# Patient Record
Sex: FEMALE | Race: BLACK OR AFRICAN AMERICAN | Employment: UNEMPLOYED | URBAN - METROPOLITAN AREA
[De-identification: names, ages, dates, MRNs, and addresses within clinical notes are randomized per-mention and may not be internally consistent; named-entity substitution may affect disease eponyms.]

---

## 2017-10-31 ENCOUNTER — TELEPHONE (OUTPATIENT)
Dept: OBGYN | Age: 77
End: 2017-10-31

## 2017-10-31 DIAGNOSIS — Z12.31 SCREENING MAMMOGRAM, ENCOUNTER FOR: Primary | ICD-10-CM

## 2017-12-14 DIAGNOSIS — Z12.31 SCREENING MAMMOGRAM, ENCOUNTER FOR: ICD-10-CM

## 2018-12-11 ENCOUNTER — TELEPHONE (OUTPATIENT)
Dept: OBGYN CLINIC | Age: 78
End: 2018-12-11

## 2023-03-24 LAB
ALANINE AMINOTRANSFERASE (SGPT) (U/L) IN SER/PLAS: 14 U/L (ref 7–45)
ALBUMIN (G/DL) IN SER/PLAS: 3.9 G/DL (ref 3.4–5)
ALKALINE PHOSPHATASE (U/L) IN SER/PLAS: 41 U/L (ref 33–136)
ANION GAP IN SER/PLAS: 12 MMOL/L (ref 10–20)
ASPARTATE AMINOTRANSFERASE (SGOT) (U/L) IN SER/PLAS: 23 U/L (ref 9–39)
BASOPHILS (10*3/UL) IN BLOOD BY AUTOMATED COUNT: 0.02 X10E9/L (ref 0–0.1)
BASOPHILS/100 LEUKOCYTES IN BLOOD BY AUTOMATED COUNT: 0.3 % (ref 0–2)
BILIRUBIN TOTAL (MG/DL) IN SER/PLAS: 0.8 MG/DL (ref 0–1.2)
CALCIUM (MG/DL) IN SER/PLAS: 10 MG/DL (ref 8.6–10.3)
CARBON DIOXIDE, TOTAL (MMOL/L) IN SER/PLAS: 30 MMOL/L (ref 21–32)
CHLORIDE (MMOL/L) IN SER/PLAS: 103 MMOL/L (ref 98–107)
CHOLESTEROL (MG/DL) IN SER/PLAS: 183 MG/DL (ref 0–199)
CHOLESTEROL IN HDL (MG/DL) IN SER/PLAS: 74.2 MG/DL
CHOLESTEROL/HDL RATIO: 2.5
CREATININE (MG/DL) IN SER/PLAS: 0.58 MG/DL (ref 0.5–1.05)
EOSINOPHILS (10*3/UL) IN BLOOD BY AUTOMATED COUNT: 0.03 X10E9/L (ref 0–0.4)
EOSINOPHILS/100 LEUKOCYTES IN BLOOD BY AUTOMATED COUNT: 0.4 % (ref 0–6)
ERYTHROCYTE DISTRIBUTION WIDTH (RATIO) BY AUTOMATED COUNT: 14.6 % (ref 11.5–14.5)
ERYTHROCYTE MEAN CORPUSCULAR HEMOGLOBIN CONCENTRATION (G/DL) BY AUTOMATED: 30 G/DL (ref 32–36)
ERYTHROCYTE MEAN CORPUSCULAR VOLUME (FL) BY AUTOMATED COUNT: 93 FL (ref 80–100)
ERYTHROCYTES (10*6/UL) IN BLOOD BY AUTOMATED COUNT: 4.57 X10E12/L (ref 4–5.2)
GFR FEMALE: >90 ML/MIN/1.73M2
GLUCOSE (MG/DL) IN SER/PLAS: 80 MG/DL (ref 74–99)
HEMATOCRIT (%) IN BLOOD BY AUTOMATED COUNT: 42.7 % (ref 36–46)
HEMOGLOBIN (G/DL) IN BLOOD: 12.8 G/DL (ref 12–16)
IMMATURE GRANULOCYTES/100 LEUKOCYTES IN BLOOD BY AUTOMATED COUNT: 0.3 % (ref 0–0.9)
LDL: 94 MG/DL (ref 0–99)
LEUKOCYTES (10*3/UL) IN BLOOD BY AUTOMATED COUNT: 6.7 X10E9/L (ref 4.4–11.3)
LYMPHOCYTES (10*3/UL) IN BLOOD BY AUTOMATED COUNT: 1.55 X10E9/L (ref 0.8–3)
LYMPHOCYTES/100 LEUKOCYTES IN BLOOD BY AUTOMATED COUNT: 23.1 % (ref 13–44)
MONOCYTES (10*3/UL) IN BLOOD BY AUTOMATED COUNT: 0.42 X10E9/L (ref 0.05–0.8)
MONOCYTES/100 LEUKOCYTES IN BLOOD BY AUTOMATED COUNT: 6.3 % (ref 2–10)
NEUTROPHILS (10*3/UL) IN BLOOD BY AUTOMATED COUNT: 4.66 X10E9/L (ref 1.6–5.5)
NEUTROPHILS/100 LEUKOCYTES IN BLOOD BY AUTOMATED COUNT: 69.6 % (ref 40–80)
PLATELETS (10*3/UL) IN BLOOD AUTOMATED COUNT: 228 X10E9/L (ref 150–450)
POTASSIUM (MMOL/L) IN SER/PLAS: 3.5 MMOL/L (ref 3.5–5.3)
PROTEIN TOTAL: 7.4 G/DL (ref 6.4–8.2)
SODIUM (MMOL/L) IN SER/PLAS: 141 MMOL/L (ref 136–145)
TRIGLYCERIDE (MG/DL) IN SER/PLAS: 75 MG/DL (ref 0–149)
URATE (MG/DL) IN SER/PLAS: 4 MG/DL (ref 2.3–6.7)
UREA NITROGEN (MG/DL) IN SER/PLAS: 18 MG/DL (ref 6–23)
VLDL: 15 MG/DL (ref 0–40)

## 2023-04-20 LAB
ACTIVATED PARTIAL THROMBOPLASTIN TIME IN PPP BY COAGULATION ASSAY: 30 SEC (ref 26–39)
ALANINE AMINOTRANSFERASE (SGPT) (U/L) IN SER/PLAS: 18 U/L (ref 7–45)
ALBUMIN (G/DL) IN SER/PLAS: 3.8 G/DL (ref 3.4–5)
ALKALINE PHOSPHATASE (U/L) IN SER/PLAS: 45 U/L (ref 33–136)
ANION GAP IN SER/PLAS: 10 MMOL/L (ref 10–20)
ASPARTATE AMINOTRANSFERASE (SGOT) (U/L) IN SER/PLAS: 21 U/L (ref 9–39)
BASOPHILS (10*3/UL) IN BLOOD BY AUTOMATED COUNT: 0.02 X10E9/L (ref 0–0.1)
BASOPHILS/100 LEUKOCYTES IN BLOOD BY AUTOMATED COUNT: 0.3 % (ref 0–2)
BILIRUBIN TOTAL (MG/DL) IN SER/PLAS: 0.5 MG/DL (ref 0–1.2)
CALCIUM (MG/DL) IN SER/PLAS: 10.2 MG/DL (ref 8.6–10.3)
CARBON DIOXIDE, TOTAL (MMOL/L) IN SER/PLAS: 34 MMOL/L (ref 21–32)
CHLORIDE (MMOL/L) IN SER/PLAS: 100 MMOL/L (ref 98–107)
CREATININE (MG/DL) IN SER/PLAS: 0.6 MG/DL (ref 0.5–1.05)
EOSINOPHILS (10*3/UL) IN BLOOD BY AUTOMATED COUNT: 0.17 X10E9/L (ref 0–0.4)
EOSINOPHILS/100 LEUKOCYTES IN BLOOD BY AUTOMATED COUNT: 2.9 % (ref 0–6)
ERYTHROCYTE DISTRIBUTION WIDTH (RATIO) BY AUTOMATED COUNT: 14.5 % (ref 11.5–14.5)
ERYTHROCYTE MEAN CORPUSCULAR HEMOGLOBIN CONCENTRATION (G/DL) BY AUTOMATED: 29.9 G/DL (ref 32–36)
ERYTHROCYTE MEAN CORPUSCULAR VOLUME (FL) BY AUTOMATED COUNT: 92 FL (ref 80–100)
ERYTHROCYTES (10*6/UL) IN BLOOD BY AUTOMATED COUNT: 4.64 X10E12/L (ref 4–5.2)
GFR FEMALE: 90 ML/MIN/1.73M2
GLUCOSE (MG/DL) IN SER/PLAS: 92 MG/DL (ref 74–99)
HEMATOCRIT (%) IN BLOOD BY AUTOMATED COUNT: 42.8 % (ref 36–46)
HEMOGLOBIN (G/DL) IN BLOOD: 12.8 G/DL (ref 12–16)
IMMATURE GRANULOCYTES/100 LEUKOCYTES IN BLOOD BY AUTOMATED COUNT: 0.2 % (ref 0–0.9)
INR IN PPP BY COAGULATION ASSAY: 1 (ref 0.9–1.1)
LEUKOCYTES (10*3/UL) IN BLOOD BY AUTOMATED COUNT: 5.9 X10E9/L (ref 4.4–11.3)
LYMPHOCYTES (10*3/UL) IN BLOOD BY AUTOMATED COUNT: 1.32 X10E9/L (ref 0.8–3)
LYMPHOCYTES/100 LEUKOCYTES IN BLOOD BY AUTOMATED COUNT: 22.6 % (ref 13–44)
MONOCYTES (10*3/UL) IN BLOOD BY AUTOMATED COUNT: 0.42 X10E9/L (ref 0.05–0.8)
MONOCYTES/100 LEUKOCYTES IN BLOOD BY AUTOMATED COUNT: 7.2 % (ref 2–10)
NEUTROPHILS (10*3/UL) IN BLOOD BY AUTOMATED COUNT: 3.91 X10E9/L (ref 1.6–5.5)
NEUTROPHILS/100 LEUKOCYTES IN BLOOD BY AUTOMATED COUNT: 66.8 % (ref 40–80)
PLATELETS (10*3/UL) IN BLOOD AUTOMATED COUNT: 243 X10E9/L (ref 150–450)
POTASSIUM (MMOL/L) IN SER/PLAS: 3.7 MMOL/L (ref 3.5–5.3)
PROTEIN TOTAL: 7.5 G/DL (ref 6.4–8.2)
PROTHROMBIN TIME (PT) IN PPP BY COAGULATION ASSAY: 12 SEC (ref 9.8–13.4)
SODIUM (MMOL/L) IN SER/PLAS: 140 MMOL/L (ref 136–145)
UREA NITROGEN (MG/DL) IN SER/PLAS: 16 MG/DL (ref 6–23)

## 2023-04-25 ENCOUNTER — HOSPITAL ENCOUNTER (OUTPATIENT)
Dept: DATA CONVERSION | Facility: HOSPITAL | Age: 83
End: 2023-04-25
Attending: PLASTIC SURGERY | Admitting: PLASTIC SURGERY
Payer: MEDICARE

## 2023-04-25 DIAGNOSIS — Z79.82 LONG TERM (CURRENT) USE OF ASPIRIN: ICD-10-CM

## 2023-04-25 DIAGNOSIS — I10 ESSENTIAL (PRIMARY) HYPERTENSION: ICD-10-CM

## 2023-04-25 DIAGNOSIS — I25.10 ATHEROSCLEROTIC HEART DISEASE OF NATIVE CORONARY ARTERY WITHOUT ANGINA PECTORIS: ICD-10-CM

## 2023-04-25 DIAGNOSIS — M06.9 RHEUMATOID ARTHRITIS, UNSPECIFIED (MULTI): ICD-10-CM

## 2023-04-25 DIAGNOSIS — E78.5 HYPERLIPIDEMIA, UNSPECIFIED: ICD-10-CM

## 2023-04-25 DIAGNOSIS — L81.9 DISORDER OF PIGMENTATION, UNSPECIFIED: ICD-10-CM

## 2023-05-15 LAB
COMPLETE PATHOLOGY REPORT: NORMAL
CONVERTED CLINICAL DIAGNOSIS-HISTORY: NORMAL
CONVERTED FINAL DIAGNOSIS: NORMAL
CONVERTED FINAL REPORT PDF LINK TO COPY AND PASTE: NORMAL
CONVERTED GROSS DESCRIPTION: NORMAL

## 2023-07-20 ENCOUNTER — HOSPITAL ENCOUNTER (OUTPATIENT)
Dept: DATA CONVERSION | Facility: HOSPITAL | Age: 83
End: 2023-07-20
Attending: OTOLARYNGOLOGY | Admitting: OTOLARYNGOLOGY
Payer: MEDICARE

## 2023-07-20 DIAGNOSIS — R04.0 EPISTAXIS: ICD-10-CM

## 2023-07-25 LAB
ALANINE AMINOTRANSFERASE (SGPT) (U/L) IN SER/PLAS: 10 U/L (ref 7–45)
ALBUMIN (G/DL) IN SER/PLAS: 3.6 G/DL (ref 3.4–5)
ALKALINE PHOSPHATASE (U/L) IN SER/PLAS: 45 U/L (ref 33–136)
ANION GAP IN SER/PLAS: 10 MMOL/L (ref 10–20)
APPEARANCE, URINE: CLEAR
ASPARTATE AMINOTRANSFERASE (SGOT) (U/L) IN SER/PLAS: 16 U/L (ref 9–39)
BASOPHILS (10*3/UL) IN BLOOD BY AUTOMATED COUNT: 0.05 X10E9/L (ref 0–0.1)
BASOPHILS/100 LEUKOCYTES IN BLOOD BY AUTOMATED COUNT: 0.6 % (ref 0–2)
BILIRUBIN TOTAL (MG/DL) IN SER/PLAS: 0.5 MG/DL (ref 0–1.2)
BILIRUBIN, URINE: NEGATIVE
BLOOD, URINE: NEGATIVE
CALCIUM (MG/DL) IN SER/PLAS: 9.8 MG/DL (ref 8.6–10.3)
CARBON DIOXIDE, TOTAL (MMOL/L) IN SER/PLAS: 34 MMOL/L (ref 21–32)
CHLORIDE (MMOL/L) IN SER/PLAS: 103 MMOL/L (ref 98–107)
CHOLESTEROL (MG/DL) IN SER/PLAS: 184 MG/DL (ref 0–199)
CHOLESTEROL IN HDL (MG/DL) IN SER/PLAS: 68.8 MG/DL
CHOLESTEROL/HDL RATIO: 2.7
COLOR, URINE: YELLOW
CREATININE (MG/DL) IN SER/PLAS: 0.66 MG/DL (ref 0.5–1.05)
EOSINOPHILS (10*3/UL) IN BLOOD BY AUTOMATED COUNT: 0.22 X10E9/L (ref 0–0.4)
EOSINOPHILS/100 LEUKOCYTES IN BLOOD BY AUTOMATED COUNT: 2.6 % (ref 0–6)
ERYTHROCYTE DISTRIBUTION WIDTH (RATIO) BY AUTOMATED COUNT: 13.9 % (ref 11.5–14.5)
ERYTHROCYTE MEAN CORPUSCULAR HEMOGLOBIN CONCENTRATION (G/DL) BY AUTOMATED: 30.1 G/DL (ref 32–36)
ERYTHROCYTE MEAN CORPUSCULAR VOLUME (FL) BY AUTOMATED COUNT: 91 FL (ref 80–100)
ERYTHROCYTES (10*6/UL) IN BLOOD BY AUTOMATED COUNT: 4.33 X10E12/L (ref 4–5.2)
GFR FEMALE: 88 ML/MIN/1.73M2
GLUCOSE (MG/DL) IN SER/PLAS: 73 MG/DL (ref 74–99)
GLUCOSE, URINE: NEGATIVE MG/DL
HEMATOCRIT (%) IN BLOOD BY AUTOMATED COUNT: 39.5 % (ref 36–46)
HEMOGLOBIN (G/DL) IN BLOOD: 11.9 G/DL (ref 12–16)
IMMATURE GRANULOCYTES/100 LEUKOCYTES IN BLOOD BY AUTOMATED COUNT: 0.2 % (ref 0–0.9)
KETONES, URINE: NEGATIVE MG/DL
LDL: 95 MG/DL (ref 0–99)
LEUKOCYTE ESTERASE, URINE: NEGATIVE
LEUKOCYTES (10*3/UL) IN BLOOD BY AUTOMATED COUNT: 8.5 X10E9/L (ref 4.4–11.3)
LYMPHOCYTES (10*3/UL) IN BLOOD BY AUTOMATED COUNT: 3.19 X10E9/L (ref 0.8–3)
LYMPHOCYTES/100 LEUKOCYTES IN BLOOD BY AUTOMATED COUNT: 37.5 % (ref 13–44)
MONOCYTES (10*3/UL) IN BLOOD BY AUTOMATED COUNT: 0.74 X10E9/L (ref 0.05–0.8)
MONOCYTES/100 LEUKOCYTES IN BLOOD BY AUTOMATED COUNT: 8.7 % (ref 2–10)
NEUTROPHILS (10*3/UL) IN BLOOD BY AUTOMATED COUNT: 4.28 X10E9/L (ref 1.6–5.5)
NEUTROPHILS/100 LEUKOCYTES IN BLOOD BY AUTOMATED COUNT: 50.4 % (ref 40–80)
NITRITE, URINE: NEGATIVE
PH, URINE: 6 (ref 5–8)
PLATELETS (10*3/UL) IN BLOOD AUTOMATED COUNT: 261 X10E9/L (ref 150–450)
POTASSIUM (MMOL/L) IN SER/PLAS: 4 MMOL/L (ref 3.5–5.3)
PROTEIN TOTAL: 7.1 G/DL (ref 6.4–8.2)
PROTEIN, URINE: NEGATIVE MG/DL
SODIUM (MMOL/L) IN SER/PLAS: 143 MMOL/L (ref 136–145)
SPECIFIC GRAVITY, URINE: 1.02 (ref 1–1.03)
TRIGLYCERIDE (MG/DL) IN SER/PLAS: 99 MG/DL (ref 0–149)
URATE (MG/DL) IN SER/PLAS: 5.1 MG/DL (ref 2.3–6.7)
UREA NITROGEN (MG/DL) IN SER/PLAS: 14 MG/DL (ref 6–23)
UROBILINOGEN, URINE: <2 MG/DL (ref 0–1.9)
VLDL: 20 MG/DL (ref 0–40)

## 2023-07-26 LAB — URINE CULTURE: ABNORMAL

## 2023-08-23 LAB
ALANINE AMINOTRANSFERASE (SGPT) (U/L) IN SER/PLAS: 9 U/L (ref 7–45)
ALBUMIN (G/DL) IN SER/PLAS: 3.7 G/DL (ref 3.4–5)
ALKALINE PHOSPHATASE (U/L) IN SER/PLAS: 45 U/L (ref 33–136)
ANION GAP IN SER/PLAS: 14 MMOL/L (ref 10–20)
ASPARTATE AMINOTRANSFERASE (SGOT) (U/L) IN SER/PLAS: 18 U/L (ref 9–39)
BILIRUBIN TOTAL (MG/DL) IN SER/PLAS: 0.3 MG/DL (ref 0–1.2)
CALCIUM (MG/DL) IN SER/PLAS: 10.1 MG/DL (ref 8.6–10.3)
CARBON DIOXIDE, TOTAL (MMOL/L) IN SER/PLAS: 27 MMOL/L (ref 21–32)
CHLORIDE (MMOL/L) IN SER/PLAS: 104 MMOL/L (ref 98–107)
CREATININE (MG/DL) IN SER/PLAS: 0.76 MG/DL (ref 0.5–1.05)
ERYTHROCYTE DISTRIBUTION WIDTH (RATIO) BY AUTOMATED COUNT: 13.9 % (ref 11.5–14.5)
ERYTHROCYTE MEAN CORPUSCULAR HEMOGLOBIN CONCENTRATION (G/DL) BY AUTOMATED: 30.7 G/DL (ref 32–36)
ERYTHROCYTE MEAN CORPUSCULAR VOLUME (FL) BY AUTOMATED COUNT: 92 FL (ref 80–100)
ERYTHROCYTES (10*6/UL) IN BLOOD BY AUTOMATED COUNT: 4.36 X10E12/L (ref 4–5.2)
GFR FEMALE: 78 ML/MIN/1.73M2
GLUCOSE (MG/DL) IN SER/PLAS: 78 MG/DL (ref 74–99)
HEMATOCRIT (%) IN BLOOD BY AUTOMATED COUNT: 40.1 % (ref 36–46)
HEMOGLOBIN (G/DL) IN BLOOD: 12.3 G/DL (ref 12–16)
LEUKOCYTES (10*3/UL) IN BLOOD BY AUTOMATED COUNT: 6.5 X10E9/L (ref 4.4–11.3)
PLATELETS (10*3/UL) IN BLOOD AUTOMATED COUNT: 222 X10E9/L (ref 150–450)
POTASSIUM (MMOL/L) IN SER/PLAS: 3.3 MMOL/L (ref 3.5–5.3)
PROTEIN TOTAL: 7.2 G/DL (ref 6.4–8.2)
SEDIMENTATION RATE, ERYTHROCYTE: 19 MM/H (ref 0–30)
SODIUM (MMOL/L) IN SER/PLAS: 142 MMOL/L (ref 136–145)
UREA NITROGEN (MG/DL) IN SER/PLAS: 20 MG/DL (ref 6–23)

## 2023-09-14 NOTE — H&P
History & Physical Reviewed:   I have reviewed the History and Physical dated:  25-Apr-2023   History and Physical reviewed and relevant findings noted. Patient examined to review pertinent physical  findings.: No significant changes   Home Medications Reviewed: no changes noted   Allergies Reviewed: no changes noted       ERAS (Enhanced Recovery After Surgery):  ·  ERAS Patient: no     Consent:   COVID-19 Consent:  ·  COVID-19 Risk Consent Surgeon has reviewed key risks related to the risk of alin COVID-19 and if they contract COVID-19 what the risks are.       Electronic Signatures:  Reyes, Roland (MD)  (Signed 25-Apr-2023 09:20)   Authored: History & Physical Reviewed, ERAS, Consent,  Note Completion      Last Updated: 25-Apr-2023 09:20 by Reyes, Roland (MD)

## 2023-09-29 VITALS — HEIGHT: 62 IN | WEIGHT: 162.7 LBS | BODY MASS INDEX: 29.94 KG/M2

## 2023-09-30 NOTE — H&P
History & Physical Reviewed:   I have reviewed the History and Physical dated:  07-Jul-2023   History and Physical reviewed and relevant findings noted. Patient examined to review pertinent physical  findings.: No significant changes   Home Medications Reviewed: no changes noted   Allergies Reviewed: no changes noted       ERAS (Enhanced Recovery After Surgery):  ·  ERAS Patient: no     Consent:   COVID-19 Consent:  ·  COVID-19 Risk Consent Surgeon has reviewed key risks related to the risk of alin COVID-19 and if they contract COVID-19 what the risks are.       Electronic Signatures:  Keny Mcgovern)  (Signed 20-Jul-2023 07:11)   Authored: History & Physical Reviewed, ERAS, Consent,  Note Completion      Last Updated: 20-Jul-2023 07:11 by Keny Mcgovern)

## 2023-10-02 ENCOUNTER — PHARMACY VISIT (OUTPATIENT)
Dept: PHARMACY | Facility: CLINIC | Age: 83
End: 2023-10-02
Payer: MEDICARE

## 2023-10-02 NOTE — OP NOTE
Post Operative Note:     PreOp Diagnosis: Pigmented lesion left foot   Post-Procedure Diagnosis: Pigmented lesion left foot   Procedure: 1.  Excision pigmented lesion left foot  2.   3.   4.   5.   Surgeon: Roland Reyes MD   Resident/Fellow/Other Assistant: Kurt Cordero   Estimated Blood Loss (mL): Minimal   Specimen: yes. Pigmented lesion foot   Findings: Minimal lesion left foot       Electronic Signatures:  Reyes, Roland (MD)  (Signed 25-Apr-2023 10:29)   Authored: Post Operative Note, Note Completion      Last Updated: 25-Apr-2023 10:29 by Reyes, Roland (MD)

## 2023-10-02 NOTE — OP NOTE
PROCEDURE DETAILS    Preoperative Diagnosis:  Epistaxis right  Postoperative Diagnosis:  Epistaxis right  Surgeon: Keny Mcgovern  Resident/Fellow/Other Assistant: None of these were associated with this case    Procedure:  1. CONTROL EPISTAXIS     Anesthesia: No anesthesiologist associated with this case  Estimated Blood Loss: min  Findings: prominent hemangioma ablated  Specimens(s) Collected: no,     Complications: none        Operative Report:   Patient was taken to the operative room and administered general anesthesia.  Appropriate prep drape and timeout performed in usual manner.  Had nasal cavity sprayed with Afrin.  Under  direct headlight visualization with nasal speculum prominent hemangioma right alar rim superiorly was identified as a bleeding source and was ablated with bipolar cautery at a low setting.  Upon completion of same look dramatically better and antibiotic  ointment is applied in usual manner.  Patient allowed to be released and taken recovery in stable condition.  Estimate blood is minimal and there were no complications.                        Attestation:   Note Completion:  Attending Attestation I performed the procedure without a resident         Electronic Signatures:  Keny Mcgovern)  (Signed 20-Jul-2023 09:34)   Authored: Post-Operative Note, Chart Review, Note Completion      Last Updated: 20-Jul-2023 09:34 by Keny Mcgovern)

## 2023-10-03 PROCEDURE — RXMED WILLOW AMBULATORY MEDICATION CHARGE

## 2023-10-30 DIAGNOSIS — M06.9 RHEUMATOID ARTHRITIS, INVOLVING UNSPECIFIED SITE, UNSPECIFIED WHETHER RHEUMATOID FACTOR PRESENT (MULTI): ICD-10-CM

## 2023-10-30 DIAGNOSIS — M05.79 SEROPOSITIVE RHEUMATOID ARTHRITIS OF MULTIPLE SITES (MULTI): ICD-10-CM

## 2023-10-30 RX ORDER — FAMOTIDINE 10 MG/ML
20 INJECTION INTRAVENOUS ONCE AS NEEDED
Status: CANCELLED | OUTPATIENT
Start: 2023-11-01

## 2023-10-30 RX ORDER — DIPHENHYDRAMINE HYDROCHLORIDE 50 MG/ML
50 INJECTION INTRAMUSCULAR; INTRAVENOUS AS NEEDED
Status: CANCELLED | OUTPATIENT
Start: 2023-11-01

## 2023-10-30 RX ORDER — ALBUTEROL SULFATE 0.83 MG/ML
3 SOLUTION RESPIRATORY (INHALATION) AS NEEDED
Status: CANCELLED | OUTPATIENT
Start: 2023-11-01

## 2023-10-30 RX ORDER — EPINEPHRINE 0.3 MG/.3ML
0.3 INJECTION SUBCUTANEOUS EVERY 5 MIN PRN
Status: CANCELLED | OUTPATIENT
Start: 2023-11-01

## 2023-11-07 ENCOUNTER — INFUSION (OUTPATIENT)
Dept: HEMATOLOGY/ONCOLOGY | Facility: CLINIC | Age: 83
End: 2023-11-07
Payer: MEDICARE

## 2023-11-07 VITALS
RESPIRATION RATE: 20 BRPM | SYSTOLIC BLOOD PRESSURE: 113 MMHG | WEIGHT: 167.77 LBS | DIASTOLIC BLOOD PRESSURE: 70 MMHG | BODY MASS INDEX: 30.72 KG/M2 | TEMPERATURE: 97.3 F | HEART RATE: 52 BPM | OXYGEN SATURATION: 95 %

## 2023-11-07 DIAGNOSIS — M06.9 RHEUMATOID ARTHRITIS (MULTI): ICD-10-CM

## 2023-11-07 DIAGNOSIS — M06.9 RHEUMATOID ARTHRITIS, INVOLVING UNSPECIFIED SITE, UNSPECIFIED WHETHER RHEUMATOID FACTOR PRESENT (MULTI): ICD-10-CM

## 2023-11-07 PROBLEM — H90.72 MIXED HEARING LOSS OF LEFT EAR: Status: ACTIVE | Noted: 2023-11-07

## 2023-11-07 PROBLEM — E21.3 HYPERPARATHYROIDISM (MULTI): Status: ACTIVE | Noted: 2023-11-07

## 2023-11-07 PROBLEM — D23.9 DERMATOFIBROMA: Status: ACTIVE | Noted: 2023-11-07

## 2023-11-07 PROBLEM — I10 ESSENTIAL HYPERTENSION: Status: ACTIVE | Noted: 2023-11-07

## 2023-11-07 PROBLEM — J31.0 CHRONIC RHINITIS: Status: ACTIVE | Noted: 2023-11-07

## 2023-11-07 PROBLEM — L81.9 ATYPICAL PIGMENTED SKIN LESION: Status: ACTIVE | Noted: 2023-11-07

## 2023-11-07 PROBLEM — E66.811 CLASS 1 OBESITY WITH BODY MASS INDEX (BMI) OF 30.0 TO 30.9 IN ADULT: Status: ACTIVE | Noted: 2023-11-07

## 2023-11-07 PROBLEM — Z87.891 H/O NICOTINE DEPENDENCE: Status: ACTIVE | Noted: 2023-11-07

## 2023-11-07 PROBLEM — E78.2 MIXED HYPERLIPIDEMIA: Status: ACTIVE | Noted: 2023-11-07

## 2023-11-07 PROBLEM — H72.92 TYMPANIC MEMBRANE PERFORATION, LEFT: Status: ACTIVE | Noted: 2023-11-07

## 2023-11-07 PROBLEM — H90.5 SENSORINEURAL HEARING LOSS OF RIGHT EAR: Status: ACTIVE | Noted: 2023-11-07

## 2023-11-07 PROBLEM — I25.10 CORONARY ARTERY DISEASE WITHOUT ANGINA PECTORIS: Status: ACTIVE | Noted: 2023-11-07

## 2023-11-07 PROBLEM — H65.22 CHRONIC SEROUS OTITIS MEDIA OF LEFT EAR: Status: ACTIVE | Noted: 2023-11-07

## 2023-11-07 PROBLEM — H92.10 OTORRHEA: Status: ACTIVE | Noted: 2023-11-07

## 2023-11-07 PROBLEM — E66.9 CLASS 1 OBESITY WITH BODY MASS INDEX (BMI) OF 30.0 TO 30.9 IN ADULT: Status: ACTIVE | Noted: 2023-11-07

## 2023-11-07 PROBLEM — J34.89 NASAL VESTIBULITIS: Status: ACTIVE | Noted: 2023-11-07

## 2023-11-07 PROCEDURE — 96415 CHEMO IV INFUSION ADDL HR: CPT

## 2023-11-07 PROCEDURE — 2500000004 HC RX 250 GENERAL PHARMACY W/ HCPCS (ALT 636 FOR OP/ED): Performed by: INTERNAL MEDICINE

## 2023-11-07 PROCEDURE — 96413 CHEMO IV INFUSION 1 HR: CPT

## 2023-11-07 RX ORDER — AMLODIPINE BESYLATE 10 MG/1
1 TABLET ORAL DAILY
COMMUNITY

## 2023-11-07 RX ORDER — IBUPROFEN 800 MG/1
1 TABLET ORAL
COMMUNITY
End: 2024-03-01 | Stop reason: HOSPADM

## 2023-11-07 RX ORDER — ACETAMINOPHEN 500 MG
1 TABLET ORAL 2 TIMES WEEKLY
COMMUNITY

## 2023-11-07 RX ORDER — METOPROLOL SUCCINATE 100 MG/1
100 TABLET, EXTENDED RELEASE ORAL DAILY
COMMUNITY

## 2023-11-07 RX ORDER — POTASSIUM CHLORIDE 750 MG/1
10 TABLET, EXTENDED RELEASE ORAL DAILY
COMMUNITY

## 2023-11-07 RX ORDER — NAPROXEN SODIUM 220 MG/1
2 TABLET, FILM COATED ORAL DAILY
COMMUNITY

## 2023-11-07 RX ORDER — FOLIC ACID 1 MG/1
1 TABLET ORAL
COMMUNITY
Start: 2015-06-05

## 2023-11-07 RX ORDER — FAMOTIDINE 10 MG/ML
20 INJECTION INTRAVENOUS ONCE AS NEEDED
Status: CANCELLED | OUTPATIENT
Start: 2024-01-02

## 2023-11-07 RX ORDER — PREDNISONE 5 MG/1
5 TABLET ORAL DAILY
COMMUNITY

## 2023-11-07 RX ORDER — LOSARTAN POTASSIUM AND HYDROCHLOROTHIAZIDE 25; 100 MG/1; MG/1
1 TABLET ORAL DAILY
COMMUNITY
End: 2024-03-01 | Stop reason: HOSPADM

## 2023-11-07 RX ORDER — EPINEPHRINE 0.3 MG/.3ML
0.3 INJECTION SUBCUTANEOUS EVERY 5 MIN PRN
Status: CANCELLED | OUTPATIENT
Start: 2024-01-02

## 2023-11-07 RX ORDER — INFLIXIMAB 100 MG/10ML
INJECTION, POWDER, LYOPHILIZED, FOR SOLUTION INTRAVENOUS
COMMUNITY

## 2023-11-07 RX ORDER — DIPHENHYDRAMINE HYDROCHLORIDE 50 MG/ML
50 INJECTION INTRAMUSCULAR; INTRAVENOUS AS NEEDED
Status: CANCELLED | OUTPATIENT
Start: 2024-01-02

## 2023-11-07 RX ORDER — VALSARTAN AND HYDROCHLOROTHIAZIDE 80; 12.5 MG/1; MG/1
1 TABLET, FILM COATED ORAL DAILY
COMMUNITY
End: 2024-03-01 | Stop reason: HOSPADM

## 2023-11-07 RX ORDER — ALBUTEROL SULFATE 0.83 MG/ML
3 SOLUTION RESPIRATORY (INHALATION) AS NEEDED
Status: CANCELLED | OUTPATIENT
Start: 2024-01-02

## 2023-11-07 RX ORDER — METHOTREXATE 2.5 MG/1
12.5 TABLET ORAL
COMMUNITY
Start: 2015-02-16 | End: 2024-03-01 | Stop reason: HOSPADM

## 2023-11-07 RX ADMIN — SODIUM CHLORIDE 400 MG: 9 INJECTION, SOLUTION INTRAVENOUS at 09:00

## 2023-11-07 ASSESSMENT — PAIN SCALES - GENERAL: PAINLEVEL: 0-NO PAIN

## 2023-11-20 ENCOUNTER — LAB (OUTPATIENT)
Dept: LAB | Facility: LAB | Age: 83
End: 2023-11-20
Payer: MEDICARE

## 2023-11-20 DIAGNOSIS — E87.6 HYPOKALEMIA: ICD-10-CM

## 2023-11-20 DIAGNOSIS — M05.79 RHEUMATOID ARTHRITIS WITH RHEUMATOID FACTOR OF MULTIPLE SITES WITHOUT ORGAN OR SYSTEMS INVOLVEMENT (MULTI): Primary | ICD-10-CM

## 2023-11-20 LAB
ALBUMIN SERPL BCP-MCNC: 3.8 G/DL (ref 3.4–5)
ALP SERPL-CCNC: 48 U/L (ref 33–136)
ALT SERPL W P-5'-P-CCNC: 10 U/L (ref 7–45)
AST SERPL W P-5'-P-CCNC: 16 U/L (ref 9–39)
BILIRUB DIRECT SERPL-MCNC: 0.1 MG/DL (ref 0–0.3)
BILIRUB SERPL-MCNC: 0.5 MG/DL (ref 0–1.2)
ERYTHROCYTE [DISTWIDTH] IN BLOOD BY AUTOMATED COUNT: 14.9 % (ref 11.5–14.5)
HCT VFR BLD AUTO: 43.2 % (ref 36–46)
HGB BLD-MCNC: 13 G/DL (ref 12–16)
MCH RBC QN AUTO: 28 PG (ref 26–34)
MCHC RBC AUTO-ENTMCNC: 30.1 G/DL (ref 32–36)
MCV RBC AUTO: 93 FL (ref 80–100)
NRBC BLD-RTO: 0 /100 WBCS (ref 0–0)
PLATELET # BLD AUTO: 227 X10*3/UL (ref 150–450)
POTASSIUM SERPL-SCNC: 3.9 MMOL/L (ref 3.5–5.3)
PROT SERPL-MCNC: 7.3 G/DL (ref 6.4–8.2)
RBC # BLD AUTO: 4.65 X10*6/UL (ref 4–5.2)
WBC # BLD AUTO: 7.2 X10*3/UL (ref 4.4–11.3)

## 2023-11-20 PROCEDURE — 36415 COLL VENOUS BLD VENIPUNCTURE: CPT

## 2023-11-20 PROCEDURE — 84132 ASSAY OF SERUM POTASSIUM: CPT

## 2023-11-20 PROCEDURE — 80076 HEPATIC FUNCTION PANEL: CPT

## 2023-11-20 PROCEDURE — 85027 COMPLETE CBC AUTOMATED: CPT

## 2023-11-30 ENCOUNTER — LAB (OUTPATIENT)
Dept: LAB | Facility: LAB | Age: 83
End: 2023-11-30
Payer: MEDICARE

## 2023-11-30 DIAGNOSIS — I12.9 HYPERTENSIVE CHRONIC KIDNEY DISEASE WITH STAGE 1 THROUGH STAGE 4 CHRONIC KIDNEY DISEASE, OR UNSPECIFIED CHRONIC KIDNEY DISEASE: ICD-10-CM

## 2023-11-30 DIAGNOSIS — Z13.89 ENCOUNTER FOR SCREENING FOR OTHER DISORDER: ICD-10-CM

## 2023-11-30 DIAGNOSIS — E78.49 OTHER HYPERLIPIDEMIA: Primary | ICD-10-CM

## 2023-11-30 LAB
ALBUMIN SERPL BCP-MCNC: 3.9 G/DL (ref 3.4–5)
ALP SERPL-CCNC: 47 U/L (ref 33–136)
ALT SERPL W P-5'-P-CCNC: 10 U/L (ref 7–45)
ANION GAP SERPL CALC-SCNC: 11 MMOL/L (ref 10–20)
AST SERPL W P-5'-P-CCNC: 15 U/L (ref 9–39)
BASOPHILS # BLD AUTO: 0.02 X10*3/UL (ref 0–0.1)
BASOPHILS NFR BLD AUTO: 0.3 %
BILIRUB SERPL-MCNC: 0.5 MG/DL (ref 0–1.2)
BUN SERPL-MCNC: 14 MG/DL (ref 6–23)
CALCIUM SERPL-MCNC: 9.7 MG/DL (ref 8.6–10.3)
CHLORIDE SERPL-SCNC: 102 MMOL/L (ref 98–107)
CHOLEST SERPL-MCNC: 198 MG/DL (ref 0–199)
CHOLESTEROL/HDL RATIO: 2.8
CO2 SERPL-SCNC: 32 MMOL/L (ref 21–32)
CREAT SERPL-MCNC: 0.68 MG/DL (ref 0.5–1.05)
EOSINOPHIL # BLD AUTO: 0.21 X10*3/UL (ref 0–0.4)
EOSINOPHIL NFR BLD AUTO: 2.8 %
ERYTHROCYTE [DISTWIDTH] IN BLOOD BY AUTOMATED COUNT: 14.9 % (ref 11.5–14.5)
GFR SERPL CREATININE-BSD FRML MDRD: 88 ML/MIN/1.73M*2
GLUCOSE SERPL-MCNC: 72 MG/DL (ref 74–99)
HCT VFR BLD AUTO: 40.2 % (ref 36–46)
HDLC SERPL-MCNC: 69.8 MG/DL
HGB BLD-MCNC: 12.2 G/DL (ref 12–16)
IMM GRANULOCYTES # BLD AUTO: 0.01 X10*3/UL (ref 0–0.5)
IMM GRANULOCYTES NFR BLD AUTO: 0.1 % (ref 0–0.9)
LDLC SERPL CALC-MCNC: 109 MG/DL
LYMPHOCYTES # BLD AUTO: 2.53 X10*3/UL (ref 0.8–3)
LYMPHOCYTES NFR BLD AUTO: 34 %
MCH RBC QN AUTO: 27.6 PG (ref 26–34)
MCHC RBC AUTO-ENTMCNC: 30.3 G/DL (ref 32–36)
MCV RBC AUTO: 91 FL (ref 80–100)
MONOCYTES # BLD AUTO: 0.72 X10*3/UL (ref 0.05–0.8)
MONOCYTES NFR BLD AUTO: 9.7 %
NEUTROPHILS # BLD AUTO: 3.95 X10*3/UL (ref 1.6–5.5)
NEUTROPHILS NFR BLD AUTO: 53.1 %
NON HDL CHOLESTEROL: 128 MG/DL (ref 0–149)
NRBC BLD-RTO: 0 /100 WBCS (ref 0–0)
PLATELET # BLD AUTO: 240 X10*3/UL (ref 150–450)
POTASSIUM SERPL-SCNC: 3.9 MMOL/L (ref 3.5–5.3)
PROT SERPL-MCNC: 7.4 G/DL (ref 6.4–8.2)
RBC # BLD AUTO: 4.42 X10*6/UL (ref 4–5.2)
SODIUM SERPL-SCNC: 141 MMOL/L (ref 136–145)
TRIGL SERPL-MCNC: 94 MG/DL (ref 0–149)
VLDL: 19 MG/DL (ref 0–40)
WBC # BLD AUTO: 7.4 X10*3/UL (ref 4.4–11.3)

## 2023-11-30 PROCEDURE — 85025 COMPLETE CBC W/AUTO DIFF WBC: CPT

## 2023-11-30 PROCEDURE — 80053 COMPREHEN METABOLIC PANEL: CPT

## 2023-11-30 PROCEDURE — 80061 LIPID PANEL: CPT

## 2023-11-30 PROCEDURE — 36415 COLL VENOUS BLD VENIPUNCTURE: CPT

## 2023-12-26 ENCOUNTER — APPOINTMENT (OUTPATIENT)
Dept: HEMATOLOGY/ONCOLOGY | Facility: CLINIC | Age: 83
End: 2023-12-26
Payer: MEDICARE

## 2024-01-02 ENCOUNTER — APPOINTMENT (OUTPATIENT)
Dept: HEMATOLOGY/ONCOLOGY | Facility: CLINIC | Age: 84
End: 2024-01-02
Payer: MEDICARE

## 2024-01-03 ENCOUNTER — APPOINTMENT (OUTPATIENT)
Dept: RADIOLOGY | Facility: HOSPITAL | Age: 84
End: 2024-01-03
Payer: MEDICARE

## 2024-01-03 ENCOUNTER — HOSPITAL ENCOUNTER (EMERGENCY)
Facility: HOSPITAL | Age: 84
Discharge: HOME | End: 2024-01-03
Payer: MEDICARE

## 2024-01-03 VITALS
HEIGHT: 62 IN | DIASTOLIC BLOOD PRESSURE: 65 MMHG | HEART RATE: 64 BPM | WEIGHT: 164 LBS | OXYGEN SATURATION: 100 % | SYSTOLIC BLOOD PRESSURE: 142 MMHG | TEMPERATURE: 97.7 F | RESPIRATION RATE: 16 BRPM | BODY MASS INDEX: 30.18 KG/M2

## 2024-01-03 DIAGNOSIS — S22.32XA CLOSED FRACTURE OF ONE RIB OF LEFT SIDE, INITIAL ENCOUNTER: ICD-10-CM
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
## 2024-01-03 LAB — SARS-COV-2 RNA RESP QL NAA+PROBE: DETECTED

## 2024-01-03 PROCEDURE — 99284 EMERGENCY DEPT VISIT MOD MDM: CPT | Mod: 25

## 2024-01-03 PROCEDURE — 99284 EMERGENCY DEPT VISIT MOD MDM: CPT

## 2024-01-03 PROCEDURE — 71250 CT THORAX DX C-: CPT

## 2024-01-03 PROCEDURE — 71250 CT THORAX DX C-: CPT | Performed by: RADIOLOGY

## 2024-01-03 PROCEDURE — 87635 SARS-COV-2 COVID-19 AMP PRB: CPT

## 2024-01-03 RX ORDER — LIDOCAINE 560 MG/1
1 PATCH PERCUTANEOUS; TOPICAL; TRANSDERMAL DAILY
Qty: 5 PATCH | Refills: 0 | Status: SHIPPED | OUTPATIENT
Start: 2024-01-03 | End: 2024-01-08

## 2024-01-03 RX ORDER — HYDROCODONE BITARTRATE AND ACETAMINOPHEN 5; 325 MG/1; MG/1
1 TABLET ORAL EVERY 6 HOURS PRN
Qty: 20 TABLET | Refills: 0 | Status: SHIPPED | OUTPATIENT
Start: 2024-01-03 | End: 2024-01-06

## 2024-01-03 ASSESSMENT — COLUMBIA-SUICIDE SEVERITY RATING SCALE - C-SSRS
6. HAVE YOU EVER DONE ANYTHING, STARTED TO DO ANYTHING, OR PREPARED TO DO ANYTHING TO END YOUR LIFE?: NO
2. HAVE YOU ACTUALLY HAD ANY THOUGHTS OF KILLING YOURSELF?: NO
1. IN THE PAST MONTH, HAVE YOU WISHED YOU WERE DEAD OR WISHED YOU COULD GO TO SLEEP AND NOT WAKE UP?: NO

## 2024-01-03 ASSESSMENT — LIFESTYLE VARIABLES
REASON UNABLE TO ASSESS: NO
HAVE PEOPLE ANNOYED YOU BY CRITICIZING YOUR DRINKING: NO
EVER FELT BAD OR GUILTY ABOUT YOUR DRINKING: NO
HAVE YOU EVER FELT YOU SHOULD CUT DOWN ON YOUR DRINKING: NO
EVER HAD A DRINK FIRST THING IN THE MORNING TO STEADY YOUR NERVES TO GET RID OF A HANGOVER: NO

## 2024-01-03 ASSESSMENT — PAIN SCALES - GENERAL: PAINLEVEL_OUTOF10: 9

## 2024-01-03 ASSESSMENT — PAIN - FUNCTIONAL ASSESSMENT: PAIN_FUNCTIONAL_ASSESSMENT: 0-10

## 2024-01-03 NOTE — ED PROVIDER NOTES
HPI   Chief Complaint   Patient presents with    Fall     Pt fell yesterday she missed a step denies hitting head for thinners c/o left rib pain and shoulder from the fall       History provided by: Patient    Limitations to history: None    CC: Rib injury    HPI: 83-year-old female presents the emergency department to be evaluated with her family for a left rib injury.  Patient states that she was walking down the stairs when she accidentally missed the last stair and fell directly on her left side.  She denies hitting her head or losing consciousness, she takes a baby aspirin every day but denies use of anticoagulants or history intracranial hemorrhage.  Denies headache, vision changes, neck pain, nausea vomiting.  Patient denies history of frequent falls or syncopal episodes.  Denies feeling weak or fatigued.  Her only complaint is pain in her left ribs that is worse with movement.  She states is hard for her to take a deep breath because of the pain.  Was not having any of this pain before she fell and denies having chest pain, feeling short of breath, cough, or hemoptysis.  Denies blood in the urine or stool.  She has been taking ibuprofen and using heating pads with some relief of her discomfort but wanted to make sure nothing was broken.  Denies pain or injury elsewhere.  Denies numbness, tingling, weakness in the extremities.  Denies history of heart or lung disease.  Denies all other systemic symptoms.    ROS: Negative unless mentioned in HPI    Social Hx: Denies tobacco, alcohol, drug use    Medical Hx: Medical history significant for hypertension and hyperlipidemia.  Allergy to shellfish.  Immunizations are up-to-date.    Physical exam:    Constitutional: Patient is well-nourished and well-developed.  Sitting comfortably in the room and in no distress.  Oriented to person, place, time, and situation.    HEENT: Head is normocephalic, atraumatic. Patient's airway is patent.  Tympanic membranes are clear  bilaterally.  Nasal mucosa clear.  Mouth with normal mucosa.  Throat is not erythematous and there are no oropharyngeal exudates, uvula is midline.  No obvious facial deformities.    Eyes: Clear bilaterally.  Pupils are equal round and reactive to light and accommodation.  Extraocular movements intact.      Cardiac: Regular rate, regular rhythm.  Heart sounds S1, S2.  No murmurs, rubs, or gallops.  PMI nondisplaced.  No JVD.    Respiratory: 100% on room air.  Regular respiratory rate and effort.  Breath sounds are clear and equal bilaterally, no adventitious lung sounds.  Patient is speaking in full sentences and is in no apparent respiratory distress. No use of accessory muscles.      Gastrointestinal: Abdomen is soft, nondistended, and nontender.  There are no obvious deformities.  No rebound tenderness or guarding.  Bowel sounds are normal active.    Genitourinary: No CVA or flank tenderness.    Musculoskeletal: Reproducible tenderness over the left lateral and posterior ribs.  No obvious skin or bony deformities.  Patient has equal range of motion in all extremities and no strength deficiencies.  No back or neck tenderness.  Capillary refill less than 3 seconds.  Strong peripheral pulses.  No sensory deficits.    Neurological: Patient is alert and oriented.  No focal deficits.  5/5 strength in all extremities.  Cranial nerves II through XII intact. GCS15.     Skin: Skin is normal color for race and is warm, dry, and intact.  No evidence of trauma.  No lesions, rashes, bruising, jaundice, or masses.    Psych: Appropriate mood and affect.  No apparent risk to self or others.    Heme/lymph: No adenopathy, lymphadenopathy, or splenomegaly    Physical exam is otherwise negative unless stated above or in history of present illness.    Patient updated on plan for lab testing, IV insertion, radiology imaging, and medications to be administered while in the ER (if indicated). Patient updated on expected wait times for  testing and results. Patient provided my name and told to ask any staff member for questions or concerns if they should arise. Electronic medical record reviewed.     MDM    Upon initial assessment, patient was healthy non-toxic appearing and in no apparent distress.     Patient presented to the emergency department with the chief complaint left rib pain .Reproducible tenderness over the left lateral and posterior ribs.  No obvious skin or bony deformities.  Patient has equal range of motion in all extremities and no strength deficiencies.  No back or neck tenderness.  Capillary refill less than 3 seconds.  Strong peripheral pulses.  No sensory deficits.  Examination of the heart and lungs unremarkable.  Head is atraumatic.  No neurological deficits.  Abdomen is soft, nontender, nondistended.  On arrival to the emergency department, vital signs were within normal limits    Will give the patient a dose of p.o. Norco and get a CT of her chest.    Upon evaluation, patient does report that she is feeling better.  She was questing a COVID test since she is recently getting over URI symptoms, states that they have greatly improved.  She did test positive.  CT confirmed an acute minimally displaced left posterior sixth rib fracture.  She also has findings of just pulmonary hypertension, renal cysts, atherosclerosis, and enlarged nodule on her thyroid.  I recommend that she follow-up with her PCP for her incidental renal cysts and thyroid nodule.  Patient feels well and would like to go home.  I do believe this is reasonable since her pain has been controlled and her vital signs are stable.    Patient will be discharged with p.o. Norco and topical lidocaine patches.  Reviewed her [facility] report and I do not see any active or recurrent narcotic prescriptions, I do believe that this can be very painful.  I recommend that she take big breaths periodically noted to prevent pneumonia and I will give her an incentive spirometry.   Discussed cool compresses.  She will follow-up with her PCP.  All questions and concerns addressed.  Reasons to return to ER discussed.  Patient verbalized understanding and agreement with the treatment plan and they remained hemodynamically stable in the ER.      This note was dictated using a speech recognition program.  While an attempt was made at proof-reading to minimize errors, minor errors in transcription may be present                          No data recorded                Patient History   History reviewed. No pertinent past medical history.  Past Surgical History:   Procedure Laterality Date    OTHER SURGICAL HISTORY  07/20/2022    Colonoscopy    OTHER SURGICAL HISTORY  07/20/2022    Neck surgery    OTHER SURGICAL HISTORY  07/20/2022    Breast biopsy    OTHER SURGICAL HISTORY  07/20/2022    Total hysterectomy abdominal    OTHER SURGICAL HISTORY  07/20/2022    Tubal ligation    OTHER SURGICAL HISTORY  07/20/2022    Knee replacement     Family History   Problem Relation Name Age of Onset    Other (htn) Mother       Social History     Tobacco Use    Smoking status: Not on file    Smokeless tobacco: Not on file   Substance Use Topics    Alcohol use: Not on file    Drug use: Not on file       Physical Exam   ED Triage Vitals [01/03/24 1540]   Temp Heart Rate Resp BP   36.5 °C (97.7 °F) 64 16 142/65      SpO2 Temp Source Heart Rate Source Patient Position   100 % Temporal -- --      BP Location FiO2 (%)     -- --       Physical Exam    ED Course & MDM   Diagnoses as of 01/03/24 1751   Fall, initial encounter   Closed fracture of one rib of left side, initial encounter       Medical Decision Making      Procedure  Procedures     Dillon Benton PA-C  01/03/24 1753

## 2024-01-09 ENCOUNTER — APPOINTMENT (OUTPATIENT)
Dept: AUDIOLOGY | Facility: CLINIC | Age: 84
End: 2024-01-09
Payer: MEDICARE

## 2024-01-13 ENCOUNTER — PHARMACY VISIT (OUTPATIENT)
Dept: PHARMACY | Facility: CLINIC | Age: 84
End: 2024-01-13

## 2024-01-13 PROCEDURE — RXMED WILLOW AMBULATORY MEDICATION CHARGE

## 2024-01-22 PROCEDURE — RXMED WILLOW AMBULATORY MEDICATION CHARGE

## 2024-02-05 ENCOUNTER — INFUSION (OUTPATIENT)
Dept: HEMATOLOGY/ONCOLOGY | Facility: CLINIC | Age: 84
End: 2024-02-05
Payer: MEDICARE

## 2024-02-05 ENCOUNTER — PHARMACY VISIT (OUTPATIENT)
Dept: PHARMACY | Facility: CLINIC | Age: 84
End: 2024-02-05
Payer: COMMERCIAL

## 2024-02-05 VITALS
WEIGHT: 161.38 LBS | BODY MASS INDEX: 29.7 KG/M2 | RESPIRATION RATE: 16 BRPM | HEIGHT: 62 IN | SYSTOLIC BLOOD PRESSURE: 114 MMHG | TEMPERATURE: 97.7 F | HEART RATE: 66 BPM | DIASTOLIC BLOOD PRESSURE: 78 MMHG | OXYGEN SATURATION: 97 %

## 2024-02-05 DIAGNOSIS — M06.9 RHEUMATOID ARTHRITIS, INVOLVING UNSPECIFIED SITE, UNSPECIFIED WHETHER RHEUMATOID FACTOR PRESENT (MULTI): Primary | ICD-10-CM

## 2024-02-05 PROCEDURE — 96413 CHEMO IV INFUSION 1 HR: CPT

## 2024-02-05 PROCEDURE — 2500000004 HC RX 250 GENERAL PHARMACY W/ HCPCS (ALT 636 FOR OP/ED): Performed by: INTERNAL MEDICINE

## 2024-02-05 PROCEDURE — 96415 CHEMO IV INFUSION ADDL HR: CPT

## 2024-02-05 RX ORDER — FAMOTIDINE 10 MG/ML
20 INJECTION INTRAVENOUS ONCE AS NEEDED
Status: CANCELLED | OUTPATIENT
Start: 2024-02-06

## 2024-02-05 RX ORDER — DIPHENHYDRAMINE HYDROCHLORIDE 50 MG/ML
50 INJECTION INTRAMUSCULAR; INTRAVENOUS AS NEEDED
Status: CANCELLED | OUTPATIENT
Start: 2024-02-06

## 2024-02-05 RX ORDER — ALBUTEROL SULFATE 0.83 MG/ML
3 SOLUTION RESPIRATORY (INHALATION) AS NEEDED
Status: CANCELLED | OUTPATIENT
Start: 2024-02-06

## 2024-02-05 RX ORDER — EPINEPHRINE 0.3 MG/.3ML
0.3 INJECTION SUBCUTANEOUS EVERY 5 MIN PRN
Status: CANCELLED | OUTPATIENT
Start: 2024-02-06

## 2024-02-05 RX ADMIN — SODIUM CHLORIDE 400 MG: 9 INJECTION, SOLUTION INTRAVENOUS at 09:14

## 2024-02-05 ASSESSMENT — PAIN SCALES - GENERAL: PAINLEVEL: 0-NO PAIN

## 2024-02-05 NOTE — PROGRESS NOTES
Patient denies any recent infection or antibiotic use.  Patient denies any recent vaccinations.  1145:  Patient received Infliximab infusion without sign of adverse reaction.  To return in 6 weeks.  Discharged in stable condition.

## 2024-02-20 ENCOUNTER — LAB (OUTPATIENT)
Dept: LAB | Facility: LAB | Age: 84
End: 2024-02-20
Payer: MEDICARE

## 2024-02-20 DIAGNOSIS — M05.79 RHEUMATOID ARTHRITIS WITH RHEUMATOID FACTOR OF MULTIPLE SITES WITHOUT ORGAN OR SYSTEMS INVOLVEMENT (MULTI): ICD-10-CM

## 2024-02-20 DIAGNOSIS — M47.817 SPONDYLOSIS WITHOUT MYELOPATHY OR RADICULOPATHY, LUMBOSACRAL REGION: ICD-10-CM

## 2024-02-20 DIAGNOSIS — I10 ESSENTIAL (PRIMARY) HYPERTENSION: Primary | ICD-10-CM

## 2024-02-20 LAB
ALBUMIN SERPL BCP-MCNC: 3.7 G/DL (ref 3.4–5)
ALP SERPL-CCNC: 60 U/L (ref 33–136)
ALT SERPL W P-5'-P-CCNC: 13 U/L (ref 7–45)
AST SERPL W P-5'-P-CCNC: 20 U/L (ref 9–39)
BILIRUB DIRECT SERPL-MCNC: 0.1 MG/DL (ref 0–0.3)
BILIRUB SERPL-MCNC: 0.4 MG/DL (ref 0–1.2)
ERYTHROCYTE [DISTWIDTH] IN BLOOD BY AUTOMATED COUNT: 15.3 % (ref 11.5–14.5)
HCT VFR BLD AUTO: 42.1 % (ref 36–46)
HGB BLD-MCNC: 12.7 G/DL (ref 12–16)
MCH RBC QN AUTO: 28 PG (ref 26–34)
MCHC RBC AUTO-ENTMCNC: 30.2 G/DL (ref 32–36)
MCV RBC AUTO: 93 FL (ref 80–100)
NRBC BLD-RTO: 0 /100 WBCS (ref 0–0)
PLATELET # BLD AUTO: 219 X10*3/UL (ref 150–450)
PROT SERPL-MCNC: 7.6 G/DL (ref 6.4–8.2)
RBC # BLD AUTO: 4.54 X10*6/UL (ref 4–5.2)
WBC # BLD AUTO: 6.4 X10*3/UL (ref 4.4–11.3)

## 2024-02-20 PROCEDURE — 80076 HEPATIC FUNCTION PANEL: CPT

## 2024-02-20 PROCEDURE — 85027 COMPLETE CBC AUTOMATED: CPT

## 2024-02-20 PROCEDURE — 36415 COLL VENOUS BLD VENIPUNCTURE: CPT

## 2024-02-25 ENCOUNTER — APPOINTMENT (OUTPATIENT)
Dept: RADIOLOGY | Facility: HOSPITAL | Age: 84
DRG: 194 | End: 2024-02-25
Payer: MEDICARE

## 2024-02-25 ENCOUNTER — APPOINTMENT (OUTPATIENT)
Dept: CARDIOLOGY | Facility: HOSPITAL | Age: 84
DRG: 194 | End: 2024-02-25
Payer: MEDICARE

## 2024-02-25 ENCOUNTER — HOSPITAL ENCOUNTER (INPATIENT)
Facility: HOSPITAL | Age: 84
LOS: 5 days | Discharge: HOME | DRG: 194 | End: 2024-03-01
Attending: STUDENT IN AN ORGANIZED HEALTH CARE EDUCATION/TRAINING PROGRAM | Admitting: INTERNAL MEDICINE
Payer: MEDICARE

## 2024-02-25 DIAGNOSIS — J11.1 INFLUENZA: Primary | ICD-10-CM

## 2024-02-25 DIAGNOSIS — R53.1 WEAKNESS: ICD-10-CM

## 2024-02-25 DIAGNOSIS — R06.02 SHORTNESS OF BREATH: ICD-10-CM

## 2024-02-25 DIAGNOSIS — I10 ESSENTIAL HYPERTENSION: ICD-10-CM

## 2024-02-25 LAB
ALBUMIN SERPL BCP-MCNC: 3.8 G/DL (ref 3.4–5)
ALP SERPL-CCNC: 55 U/L (ref 33–136)
ALT SERPL W P-5'-P-CCNC: 11 U/L (ref 7–45)
ANION GAP SERPL CALC-SCNC: 11 MMOL/L (ref 10–20)
AST SERPL W P-5'-P-CCNC: 23 U/L (ref 9–39)
ATRIAL RATE: 106 BPM
ATRIAL RATE: 106 BPM
BASOPHILS # BLD AUTO: 0.02 X10*3/UL (ref 0–0.1)
BASOPHILS NFR BLD AUTO: 0.3 %
BILIRUB SERPL-MCNC: 0.5 MG/DL (ref 0–1.2)
BNP SERPL-MCNC: 277 PG/ML (ref 0–99)
BNP SERPL-MCNC: 98 PG/ML (ref 0–99)
BUN SERPL-MCNC: 16 MG/DL (ref 6–23)
CALCIUM SERPL-MCNC: 9.4 MG/DL (ref 8.6–10.3)
CARDIAC TROPONIN I PNL SERPL HS: 12 NG/L (ref 0–13)
CARDIAC TROPONIN I PNL SERPL HS: 15 NG/L (ref 0–13)
CARDIAC TROPONIN I PNL SERPL HS: 21 NG/L (ref 0–13)
CHLORIDE SERPL-SCNC: 100 MMOL/L (ref 98–107)
CO2 SERPL-SCNC: 29 MMOL/L (ref 21–32)
CREAT SERPL-MCNC: 0.62 MG/DL (ref 0.5–1.05)
EGFRCR SERPLBLD CKD-EPI 2021: 89 ML/MIN/1.73M*2
EOSINOPHIL # BLD AUTO: 0.1 X10*3/UL (ref 0–0.4)
EOSINOPHIL NFR BLD AUTO: 1.3 %
ERYTHROCYTE [DISTWIDTH] IN BLOOD BY AUTOMATED COUNT: 14.6 % (ref 11.5–14.5)
FLUAV RNA RESP QL NAA+PROBE: DETECTED
FLUBV RNA RESP QL NAA+PROBE: NOT DETECTED
GLUCOSE SERPL-MCNC: 96 MG/DL (ref 74–99)
HCT VFR BLD AUTO: 40 % (ref 36–46)
HGB BLD-MCNC: 12.9 G/DL (ref 12–16)
IMM GRANULOCYTES # BLD AUTO: 0.02 X10*3/UL (ref 0–0.5)
IMM GRANULOCYTES NFR BLD AUTO: 0.3 % (ref 0–0.9)
INR PPP: 1.1 (ref 0.9–1.1)
LYMPHOCYTES # BLD AUTO: 0.85 X10*3/UL (ref 0.8–3)
LYMPHOCYTES NFR BLD AUTO: 11.5 %
MAGNESIUM SERPL-MCNC: 1.64 MG/DL (ref 1.6–2.4)
MCH RBC QN AUTO: 28.5 PG (ref 26–34)
MCHC RBC AUTO-ENTMCNC: 32.3 G/DL (ref 32–36)
MCV RBC AUTO: 89 FL (ref 80–100)
MONOCYTES # BLD AUTO: 0.91 X10*3/UL (ref 0.05–0.8)
MONOCYTES NFR BLD AUTO: 12.3 %
NEUTROPHILS # BLD AUTO: 5.51 X10*3/UL (ref 1.6–5.5)
NEUTROPHILS NFR BLD AUTO: 74.3 %
NRBC BLD-RTO: 0 /100 WBCS (ref 0–0)
P AXIS: 59 DEGREES
P AXIS: 67 DEGREES
P OFFSET: 181 MS
P OFFSET: 185 MS
P ONSET: 130 MS
P ONSET: 134 MS
PLATELET # BLD AUTO: 197 X10*3/UL (ref 150–450)
POTASSIUM SERPL-SCNC: 3.3 MMOL/L (ref 3.5–5.3)
PR INTERVAL: 172 MS
PR INTERVAL: 176 MS
PROT SERPL-MCNC: 7.5 G/DL (ref 6.4–8.2)
PROTHROMBIN TIME: 12.5 SECONDS (ref 9.8–12.8)
Q ONSET: 218 MS
Q ONSET: 220 MS
QRS COUNT: 17 BEATS
QRS COUNT: 18 BEATS
QRS DURATION: 126 MS
QRS DURATION: 132 MS
QT INTERVAL: 354 MS
QT INTERVAL: 356 MS
QTC CALCULATION(BAZETT): 470 MS
QTC CALCULATION(BAZETT): 472 MS
QTC FREDERICIA: 427 MS
QTC FREDERICIA: 430 MS
R AXIS: -6 DEGREES
R AXIS: 14 DEGREES
RBC # BLD AUTO: 4.52 X10*6/UL (ref 4–5.2)
SARS-COV-2 RNA RESP QL NAA+PROBE: NOT DETECTED
SODIUM SERPL-SCNC: 137 MMOL/L (ref 136–145)
T AXIS: 91 DEGREES
T AXIS: 91 DEGREES
T OFFSET: 395 MS
T OFFSET: 398 MS
VENTRICULAR RATE: 106 BPM
VENTRICULAR RATE: 106 BPM
WBC # BLD AUTO: 7.4 X10*3/UL (ref 4.4–11.3)

## 2024-02-25 PROCEDURE — 83735 ASSAY OF MAGNESIUM: CPT | Performed by: NURSE PRACTITIONER

## 2024-02-25 PROCEDURE — 80053 COMPREHEN METABOLIC PANEL: CPT | Performed by: NURSE PRACTITIONER

## 2024-02-25 PROCEDURE — G0378 HOSPITAL OBSERVATION PER HR: HCPCS

## 2024-02-25 PROCEDURE — 84484 ASSAY OF TROPONIN QUANT: CPT | Performed by: NURSE PRACTITIONER

## 2024-02-25 PROCEDURE — 1210000001 HC SEMI-PRIVATE ROOM DAILY

## 2024-02-25 PROCEDURE — 2500000002 HC RX 250 W HCPCS SELF ADMINISTERED DRUGS (ALT 637 FOR MEDICARE OP, ALT 636 FOR OP/ED): Performed by: NURSE PRACTITIONER

## 2024-02-25 PROCEDURE — 71045 X-RAY EXAM CHEST 1 VIEW: CPT

## 2024-02-25 PROCEDURE — 83880 ASSAY OF NATRIURETIC PEPTIDE: CPT | Performed by: INTERNAL MEDICINE

## 2024-02-25 PROCEDURE — 84484 ASSAY OF TROPONIN QUANT: CPT | Performed by: INTERNAL MEDICINE

## 2024-02-25 PROCEDURE — 85610 PROTHROMBIN TIME: CPT | Performed by: NURSE PRACTITIONER

## 2024-02-25 PROCEDURE — 99285 EMERGENCY DEPT VISIT HI MDM: CPT | Mod: 25

## 2024-02-25 PROCEDURE — 2500000004 HC RX 250 GENERAL PHARMACY W/ HCPCS (ALT 636 FOR OP/ED): Performed by: NURSE PRACTITIONER

## 2024-02-25 PROCEDURE — 2500000004 HC RX 250 GENERAL PHARMACY W/ HCPCS (ALT 636 FOR OP/ED): Performed by: INTERNAL MEDICINE

## 2024-02-25 PROCEDURE — 83880 ASSAY OF NATRIURETIC PEPTIDE: CPT | Performed by: NURSE PRACTITIONER

## 2024-02-25 PROCEDURE — 2500000002 HC RX 250 W HCPCS SELF ADMINISTERED DRUGS (ALT 637 FOR MEDICARE OP, ALT 636 FOR OP/ED): Performed by: INTERNAL MEDICINE

## 2024-02-25 PROCEDURE — 93005 ELECTROCARDIOGRAM TRACING: CPT

## 2024-02-25 PROCEDURE — 71045 X-RAY EXAM CHEST 1 VIEW: CPT | Mod: FOREIGN READ | Performed by: RADIOLOGY

## 2024-02-25 PROCEDURE — 36415 COLL VENOUS BLD VENIPUNCTURE: CPT | Performed by: NURSE PRACTITIONER

## 2024-02-25 PROCEDURE — 85025 COMPLETE CBC W/AUTO DIFF WBC: CPT | Performed by: NURSE PRACTITIONER

## 2024-02-25 PROCEDURE — 94640 AIRWAY INHALATION TREATMENT: CPT

## 2024-02-25 PROCEDURE — 87636 SARSCOV2 & INF A&B AMP PRB: CPT | Performed by: NURSE PRACTITIONER

## 2024-02-25 RX ORDER — IBUPROFEN 800 MG/1
800 TABLET ORAL 2 TIMES DAILY PRN
Status: DISCONTINUED | OUTPATIENT
Start: 2024-02-25 | End: 2024-03-01 | Stop reason: HOSPADM

## 2024-02-25 RX ORDER — METHOTREXATE 2.5 MG/1
12.5 TABLET ORAL
Status: DISCONTINUED | OUTPATIENT
Start: 2024-02-26 | End: 2024-02-26

## 2024-02-25 RX ORDER — METOPROLOL SUCCINATE 50 MG/1
100 TABLET, EXTENDED RELEASE ORAL DAILY
Status: DISCONTINUED | OUTPATIENT
Start: 2024-02-25 | End: 2024-03-01 | Stop reason: HOSPADM

## 2024-02-25 RX ORDER — IPRATROPIUM BROMIDE AND ALBUTEROL SULFATE 2.5; .5 MG/3ML; MG/3ML
3 SOLUTION RESPIRATORY (INHALATION)
Status: DISCONTINUED | OUTPATIENT
Start: 2024-02-25 | End: 2024-02-26

## 2024-02-25 RX ORDER — IPRATROPIUM BROMIDE AND ALBUTEROL SULFATE 2.5; .5 MG/3ML; MG/3ML
3 SOLUTION RESPIRATORY (INHALATION) ONCE
Status: COMPLETED | OUTPATIENT
Start: 2024-02-25 | End: 2024-02-25

## 2024-02-25 RX ORDER — KETOROLAC TROMETHAMINE 30 MG/ML
15 INJECTION, SOLUTION INTRAMUSCULAR; INTRAVENOUS ONCE
Status: COMPLETED | OUTPATIENT
Start: 2024-02-25 | End: 2024-02-25

## 2024-02-25 RX ORDER — AMLODIPINE BESYLATE 5 MG/1
10 TABLET ORAL DAILY
Status: DISCONTINUED | OUTPATIENT
Start: 2024-02-25 | End: 2024-02-26

## 2024-02-25 RX ORDER — PREDNISONE 5 MG/1
5 TABLET ORAL DAILY
Status: DISCONTINUED | OUTPATIENT
Start: 2024-02-25 | End: 2024-03-01 | Stop reason: HOSPADM

## 2024-02-25 RX ORDER — ENOXAPARIN SODIUM 100 MG/ML
40 INJECTION SUBCUTANEOUS DAILY
Status: DISCONTINUED | OUTPATIENT
Start: 2024-02-25 | End: 2024-03-01 | Stop reason: HOSPADM

## 2024-02-25 RX ORDER — CHOLECALCIFEROL (VITAMIN D3) 25 MCG
2000 TABLET ORAL 2 TIMES WEEKLY
Status: DISCONTINUED | OUTPATIENT
Start: 2024-02-26 | End: 2024-03-01 | Stop reason: HOSPADM

## 2024-02-25 RX ORDER — NAPROXEN SODIUM 220 MG/1
162 TABLET, FILM COATED ORAL DAILY
Status: DISCONTINUED | OUTPATIENT
Start: 2024-02-25 | End: 2024-03-01 | Stop reason: HOSPADM

## 2024-02-25 RX ORDER — OSELTAMIVIR PHOSPHATE 75 MG/1
75 CAPSULE ORAL 2 TIMES DAILY
Status: COMPLETED | OUTPATIENT
Start: 2024-02-25 | End: 2024-03-01

## 2024-02-25 RX ORDER — FOLIC ACID 1 MG/1
1 TABLET ORAL
Status: DISCONTINUED | OUTPATIENT
Start: 2024-02-26 | End: 2024-02-27

## 2024-02-25 RX ORDER — POTASSIUM CHLORIDE 750 MG/1
10 TABLET, FILM COATED, EXTENDED RELEASE ORAL DAILY
Status: DISCONTINUED | OUTPATIENT
Start: 2024-02-25 | End: 2024-03-01 | Stop reason: HOSPADM

## 2024-02-25 RX ADMIN — PREDNISONE 5 MG: 5 TABLET ORAL at 20:12

## 2024-02-25 RX ADMIN — POTASSIUM CHLORIDE 10 MEQ: 750 TABLET, EXTENDED RELEASE ORAL at 20:12

## 2024-02-25 RX ADMIN — ENOXAPARIN SODIUM 40 MG: 40 INJECTION SUBCUTANEOUS at 20:12

## 2024-02-25 RX ADMIN — IPRATROPIUM BROMIDE AND ALBUTEROL SULFATE 3 ML: 2.5; .5 SOLUTION RESPIRATORY (INHALATION) at 09:05

## 2024-02-25 RX ADMIN — OSELTAMIVIR PHOSPHATE 75 MG: 75 CAPSULE ORAL at 20:12

## 2024-02-25 RX ADMIN — KETOROLAC TROMETHAMINE 15 MG: 30 INJECTION, SOLUTION INTRAMUSCULAR; INTRAVENOUS at 12:49

## 2024-02-25 SDOH — SOCIAL STABILITY: SOCIAL INSECURITY: ABUSE: ADULT

## 2024-02-25 SDOH — SOCIAL STABILITY: SOCIAL INSECURITY: DOES ANYONE TRY TO KEEP YOU FROM HAVING/CONTACTING OTHER FRIENDS OR DOING THINGS OUTSIDE YOUR HOME?: NO

## 2024-02-25 SDOH — SOCIAL STABILITY: SOCIAL INSECURITY: DO YOU FEEL ANYONE HAS EXPLOITED OR TAKEN ADVANTAGE OF YOU FINANCIALLY OR OF YOUR PERSONAL PROPERTY?: NO

## 2024-02-25 SDOH — SOCIAL STABILITY: SOCIAL INSECURITY: HAS ANYONE EVER THREATENED TO HURT YOUR FAMILY OR YOUR PETS?: NO

## 2024-02-25 SDOH — SOCIAL STABILITY: SOCIAL INSECURITY: DO YOU FEEL UNSAFE GOING BACK TO THE PLACE WHERE YOU ARE LIVING?: NO

## 2024-02-25 SDOH — SOCIAL STABILITY: SOCIAL INSECURITY: WERE YOU ABLE TO COMPLETE ALL THE BEHAVIORAL HEALTH SCREENINGS?: YES

## 2024-02-25 SDOH — SOCIAL STABILITY: SOCIAL INSECURITY: HAVE YOU HAD THOUGHTS OF HARMING ANYONE ELSE?: NO

## 2024-02-25 SDOH — SOCIAL STABILITY: SOCIAL INSECURITY: ARE YOU OR HAVE YOU BEEN THREATENED OR ABUSED PHYSICALLY, EMOTIONALLY, OR SEXUALLY BY ANYONE?: NO

## 2024-02-25 SDOH — SOCIAL STABILITY: SOCIAL INSECURITY: ARE THERE ANY APPARENT SIGNS OF INJURIES/BEHAVIORS THAT COULD BE RELATED TO ABUSE/NEGLECT?: NO

## 2024-02-25 ASSESSMENT — PAIN SCALES - GENERAL
PAINLEVEL_OUTOF10: 10 - WORST POSSIBLE PAIN
PAINLEVEL_OUTOF10: 0 - NO PAIN
PAINLEVEL_OUTOF10: 5 - MODERATE PAIN
PAINLEVEL_OUTOF10: 4
PAINLEVEL_OUTOF10: 0 - NO PAIN

## 2024-02-25 ASSESSMENT — COGNITIVE AND FUNCTIONAL STATUS - GENERAL
PATIENT BASELINE BEDBOUND: NO
CLIMB 3 TO 5 STEPS WITH RAILING: A LITTLE
DAILY ACTIVITIY SCORE: 24
MOBILITY SCORE: 23
DAILY ACTIVITIY SCORE: 24
CLIMB 3 TO 5 STEPS WITH RAILING: A LITTLE
MOBILITY SCORE: 23

## 2024-02-25 ASSESSMENT — ACTIVITIES OF DAILY LIVING (ADL)
WALKS IN HOME: INDEPENDENT
DRESSING YOURSELF: INDEPENDENT
BATHING: INDEPENDENT
TOILETING: INDEPENDENT
ADEQUATE_TO_COMPLETE_ADL: YES
ASSISTIVE_DEVICE: EYEGLASSES;DENTURES LOWER;DENTURES UPPER
HEARING - RIGHT EAR: FUNCTIONAL
GROOMING: INDEPENDENT
HEARING - LEFT EAR: FUNCTIONAL
JUDGMENT_ADEQUATE_SAFELY_COMPLETE_DAILY_ACTIVITIES: YES
LACK_OF_TRANSPORTATION: PATIENT DECLINED
FEEDING YOURSELF: INDEPENDENT
PATIENT'S MEMORY ADEQUATE TO SAFELY COMPLETE DAILY ACTIVITIES?: YES

## 2024-02-25 ASSESSMENT — PAIN DESCRIPTION - LOCATION: LOCATION: HEAD

## 2024-02-25 ASSESSMENT — LIFESTYLE VARIABLES
AUDIT-C TOTAL SCORE: 0
AUDIT-C TOTAL SCORE: 0
SKIP TO QUESTIONS 9-10: 1
HOW OFTEN DO YOU HAVE A DRINK CONTAINING ALCOHOL: NEVER
EVER FELT BAD OR GUILTY ABOUT YOUR DRINKING: NO
HOW MANY STANDARD DRINKS CONTAINING ALCOHOL DO YOU HAVE ON A TYPICAL DAY: PATIENT DOES NOT DRINK
HAVE YOU EVER FELT YOU SHOULD CUT DOWN ON YOUR DRINKING: NO
HAVE PEOPLE ANNOYED YOU BY CRITICIZING YOUR DRINKING: NO
EVER HAD A DRINK FIRST THING IN THE MORNING TO STEADY YOUR NERVES TO GET RID OF A HANGOVER: NO
HOW OFTEN DO YOU HAVE 6 OR MORE DRINKS ON ONE OCCASION: NEVER

## 2024-02-25 ASSESSMENT — PATIENT HEALTH QUESTIONNAIRE - PHQ9
1. LITTLE INTEREST OR PLEASURE IN DOING THINGS: NOT AT ALL
2. FEELING DOWN, DEPRESSED OR HOPELESS: NOT AT ALL
SUM OF ALL RESPONSES TO PHQ9 QUESTIONS 1 & 2: 0

## 2024-02-25 ASSESSMENT — COLUMBIA-SUICIDE SEVERITY RATING SCALE - C-SSRS
6. HAVE YOU EVER DONE ANYTHING, STARTED TO DO ANYTHING, OR PREPARED TO DO ANYTHING TO END YOUR LIFE?: NO
1. IN THE PAST MONTH, HAVE YOU WISHED YOU WERE DEAD OR WISHED YOU COULD GO TO SLEEP AND NOT WAKE UP?: NO
2. HAVE YOU ACTUALLY HAD ANY THOUGHTS OF KILLING YOURSELF?: NO

## 2024-02-25 ASSESSMENT — PAIN - FUNCTIONAL ASSESSMENT
PAIN_FUNCTIONAL_ASSESSMENT: 0-10
PAIN_FUNCTIONAL_ASSESSMENT: 0-10

## 2024-02-25 NOTE — ED PROVIDER NOTES
HPI   Chief Complaint   Patient presents with    Shortness of Breath       84-year-old female presents emergency department with complaints of shortness of breath.  Patient states yesterday evening started with sudden onset shortness of breath, states unable to lay back due to the shortness of breath.  Spent the night sitting up to help her breathing.  States throughout the day yesterday she was feeling fine.  Denies any recent cough, states nothing she is coughing up is some mild clear mucus.    Patient denies any previous heart history, no past heart attacks, denies any associated chest discomfort, tightness, pain or pressure.    States she was a smoker but quit many years ago, no COPD history.      History provided by:  Patient and relative   used: No                        No data recorded                   Patient History   No past medical history on file.  Past Surgical History:   Procedure Laterality Date    OTHER SURGICAL HISTORY  07/20/2022    Colonoscopy    OTHER SURGICAL HISTORY  07/20/2022    Neck surgery    OTHER SURGICAL HISTORY  07/20/2022    Breast biopsy    OTHER SURGICAL HISTORY  07/20/2022    Total hysterectomy abdominal    OTHER SURGICAL HISTORY  07/20/2022    Tubal ligation    OTHER SURGICAL HISTORY  07/20/2022    Knee replacement     Family History   Problem Relation Name Age of Onset    Other (htn) Mother       Social History     Tobacco Use    Smoking status: Not on file    Smokeless tobacco: Not on file   Substance Use Topics    Alcohol use: Not on file    Drug use: Not on file       Physical Exam   ED Triage Vitals [02/25/24 0734]   Temperature Heart Rate Respirations BP   36.3 °C (97.3 °F) (!) 108 20 165/83      Pulse Ox Temp src Heart Rate Source Patient Position   (!) 92 % -- -- --      BP Location FiO2 (%)     -- --       Physical Exam  Physical Exam:  Constitutional: Vitals noted, no distress. Afebrile.   EENT: TMs clear. Posterior oropharynx unremarkable.    Cardiovascular: Regular, rate, rhythm, no murmur.   Pulmonary: Lungs significantly diminished bilaterally  Gastrointestinal: Soft, nonsurgical. Nontender. No peritoneal signs. Normoactive bowel sounds.   Musculoskeletal: No peripheral edema. Negative Homans bilaterally, no cords.   Skin: No rash.   Neuro: No focal neurologic deficits, NIH score of 0.    ED Course & MDM   ED Course as of 02/25/24 1226   Sun Feb 25, 2024   0946 Troponin I Series, High Sensitivity (0, 1 HR) [LV]      ED Course User Index  [LV] Brittany Cabrera, APRN-CNP         Diagnoses as of 02/25/24 1226   Influenza   Shortness of breath   Weakness     Labs Reviewed   CBC WITH AUTO DIFFERENTIAL - Abnormal       Result Value    WBC 7.4      nRBC 0.0      RBC 4.52      Hemoglobin 12.9      Hematocrit 40.0      MCV 89      MCH 28.5      MCHC 32.3      RDW 14.6 (*)     Platelets 197      Neutrophils % 74.3      Immature Granulocytes %, Automated 0.3      Lymphocytes % 11.5      Monocytes % 12.3      Eosinophils % 1.3      Basophils % 0.3      Neutrophils Absolute 5.51 (*)     Immature Granulocytes Absolute, Automated 0.02      Lymphocytes Absolute 0.85      Monocytes Absolute 0.91 (*)     Eosinophils Absolute 0.10      Basophils Absolute 0.02     COMPREHENSIVE METABOLIC PANEL - Abnormal    Glucose 96      Sodium 137      Potassium 3.3 (*)     Chloride 100      Bicarbonate 29      Anion Gap 11      Urea Nitrogen 16      Creatinine 0.62      eGFR 89      Calcium 9.4      Albumin 3.8      Alkaline Phosphatase 55      Total Protein 7.5      AST 23      Bilirubin, Total 0.5      ALT 11     SARS-COV-2 AND INFLUENZA A/B PCR - Abnormal    Flu A Result Detected (*)     Flu B Result Not Detected      Coronavirus 2019, PCR Not Detected      Narrative:     This assay has received FDA Emergency Use Authorization (EUA) and  is only authorized for the duration of time that circumstances exist to justify the authorization of the emergency use of in vitro diagnostic  tests for the detection of SARS-CoV-2 virus and/or diagnosis of COVID-19 infection under section 564(b)(1) of the Act, 21 U.S.C. 360bbb-3(b)(1). Testing for SARS-CoV-2 is only recommended for patients who meet current clinical and/or epidemiological criteria as defined by federal, state, or local public health directives. This assay is an in vitro diagnostic nucleic acid amplification test for the qualitative detection of SARS-CoV-2, Influenza A, and Influenza B from nasopharyngeal specimens and has been validated for use at [facility]. Negative results do not preclude COVID-19 infections or Influenza A/B infections, and should not be used as the sole basis for diagnosis, treatment, or other management decisions. If Influenza A/B and RSV PCR results are negative, testing for Parainfluenza virus, Adenovirus and Metapneumovirus is routinely performed for [facility] pediatric oncology and intensive care inpatients, and is available on other patients by placing an add-on request.    TROPONIN I, HIGH SENSITIVITY - Abnormal    Troponin I, High Sensitivity 15 (*)     Narrative:     Less than 99th percentile of normal range cutoff-  Female and children under 18 years old <14 ng/L; Male <21 ng/L: Negative  Repeat testing should be performed if clinically indicated.     Female and children under 18 years old 14-50 ng/L; Male 21-50 ng/L:  Consistent with possible cardiac damage and possible increased clinical   risk. Serial measurements may help to assess extent of myocardial damage.     >50 ng/L: Consistent with cardiac damage, increased clinical risk and  myocardial infarction. Serial measurements may help assess extent of   myocardial damage.      NOTE: Children less than 1 year old may have higher baseline troponin   levels and results should be interpreted in conjunction with the overall   clinical context.     NOTE: Troponin I testing is performed using a different   testing methodology at [place]  [facility] than at other   system [facility]. Direct result comparisons should only   be made within the same method.   MAGNESIUM - Normal    Magnesium 1.64     PROTIME-INR - Normal    Protime 12.5      INR 1.1     B-TYPE NATRIURETIC PEPTIDE - Normal    BNP 98      Narrative:        <100 pg/mL - Heart failure unlikely  100-299 pg/mL - Intermediate probability of acute heart                  failure exacerbation. Correlate with clinical                  context and patient history.    >=300 pg/mL - Heart Failure likely. Correlate with clinical                  context and patient history.    BNP testing is performed using different testing methodology at [facility] than at other [facility]. Direct result comparisons should only be made within the same method.      SERIAL TROPONIN-INITIAL - Normal    Troponin I, High Sensitivity 12      Narrative:     Less than 99th percentile of normal range cutoff-  Female and children under 18 years old <14 ng/L; Male <21 ng/L: Negative  Repeat testing should be performed if clinically indicated.     Female and children under 18 years old 14-50 ng/L; Male 21-50 ng/L:  Consistent with possible cardiac damage and possible increased clinical   risk. Serial measurements may help to assess extent of myocardial damage.     >50 ng/L: Consistent with cardiac damage, increased clinical risk and  myocardial infarction. Serial measurements may help assess extent of   myocardial damage.      NOTE: Children less than 1 year old may have higher baseline troponin   levels and results should be interpreted in conjunction with the overall   clinical context.     NOTE: Troponin I testing is performed using a different   testing methodology at [facility] than at other   [facility]. Direct result comparisons should only   be made within the same method.   TROPONIN SERIES- (INITIAL, 1 HR)    Narrative:     The following orders were created for panel order  Troponin I Series, High Sensitivity (0, 1 HR).  Procedure                               Abnormality         Status                     ---------                               -----------         ------                     Troponin I, High Sensiti...[[id number]]  Normal              Final result               Troponin, High Sensitivi...[[id number]]                                                   Please view results for these tests on the individual orders.   SERIAL TROPONIN, 1 HOUR        XR chest 1 view   Final Result   No acute disease. Calcified pulmonary granulomas observed throughout.   Signed by Mu Valle MD           Medical Decision Making  EKG obtained at 734 with ventricular to 106, as interpreted by me, shows sinus tachycardia with left bundle branch block, unremarkable ST and T wave pattern, no evidence of acute ischemia or other acute findings.    DuoNeb treatment was provided as the patient's respiratory exam did show that she was diminished throughout.    Workup obtained, CBC and metabolic panels unremarkable, initial troponin 12, delta 15.  Patient did test positive for flu.  Chest x-ray was ultimately unremarkable.    Reevaluated the patient, still having headache and feeling very weak but states her respiratory status has improved.  Will provide Toradol for her headache.    Repeat EKG at 913 with ventricular rate of 106, as interpreted me, shows sinus tachycardia with left bundle branch block, otherwise unremarkable ST-T wave patterns, no evidence of acute ischemia other acute findings.    Reached out, spoke with Dr. Mccord to discuss the patient, agreed to hospitalization for further monitoring respiratory status and weakness.    Procedure  Procedures     Brittany Cabrera, HERNAN-CLIFF  02/25/24 0840

## 2024-02-26 PROCEDURE — 2500000004 HC RX 250 GENERAL PHARMACY W/ HCPCS (ALT 636 FOR OP/ED): Performed by: INTERNAL MEDICINE

## 2024-02-26 PROCEDURE — 2500000001 HC RX 250 WO HCPCS SELF ADMINISTERED DRUGS (ALT 637 FOR MEDICARE OP): Performed by: INTERNAL MEDICINE

## 2024-02-26 PROCEDURE — 1210000001 HC SEMI-PRIVATE ROOM DAILY

## 2024-02-26 PROCEDURE — 2500000002 HC RX 250 W HCPCS SELF ADMINISTERED DRUGS (ALT 637 FOR MEDICARE OP, ALT 636 FOR OP/ED): Performed by: INTERNAL MEDICINE

## 2024-02-26 PROCEDURE — 94640 AIRWAY INHALATION TREATMENT: CPT

## 2024-02-26 PROCEDURE — G0378 HOSPITAL OBSERVATION PER HR: HCPCS

## 2024-02-26 PROCEDURE — 97161 PT EVAL LOW COMPLEX 20 MIN: CPT | Mod: GP | Performed by: PHYSICAL THERAPIST

## 2024-02-26 RX ORDER — IPRATROPIUM BROMIDE AND ALBUTEROL SULFATE 2.5; .5 MG/3ML; MG/3ML
3 SOLUTION RESPIRATORY (INHALATION) EVERY 2 HOUR PRN
Status: DISCONTINUED | OUTPATIENT
Start: 2024-02-26 | End: 2024-03-01 | Stop reason: HOSPADM

## 2024-02-26 RX ORDER — METHOTREXATE 2.5 MG/1
12.5 TABLET ORAL
Status: DISCONTINUED | OUTPATIENT
Start: 2024-02-27 | End: 2024-03-01 | Stop reason: HOSPADM

## 2024-02-26 RX ORDER — AMLODIPINE BESYLATE 5 MG/1
5 TABLET ORAL 2 TIMES DAILY
Status: DISCONTINUED | OUTPATIENT
Start: 2024-02-27 | End: 2024-03-01 | Stop reason: HOSPADM

## 2024-02-26 RX ADMIN — Medication 2000 UNITS: at 09:42

## 2024-02-26 RX ADMIN — METOPROLOL SUCCINATE 100 MG: 50 TABLET, EXTENDED RELEASE ORAL at 09:42

## 2024-02-26 RX ADMIN — ENOXAPARIN SODIUM 40 MG: 40 INJECTION SUBCUTANEOUS at 09:41

## 2024-02-26 RX ADMIN — IPRATROPIUM BROMIDE AND ALBUTEROL SULFATE 3 ML: 2.5; .5 SOLUTION RESPIRATORY (INHALATION) at 02:27

## 2024-02-26 RX ADMIN — PREDNISONE 5 MG: 5 TABLET ORAL at 09:43

## 2024-02-26 RX ADMIN — POTASSIUM CHLORIDE 10 MEQ: 750 TABLET, EXTENDED RELEASE ORAL at 09:42

## 2024-02-26 RX ADMIN — OSELTAMIVIR PHOSPHATE 75 MG: 75 CAPSULE ORAL at 09:42

## 2024-02-26 RX ADMIN — LOSARTAN POTASSIUM: 100 TABLET, FILM COATED ORAL at 09:47

## 2024-02-26 RX ADMIN — OSELTAMIVIR PHOSPHATE 75 MG: 75 CAPSULE ORAL at 20:02

## 2024-02-26 RX ADMIN — IPRATROPIUM BROMIDE AND ALBUTEROL SULFATE 3 ML: 2.5; .5 SOLUTION RESPIRATORY (INHALATION) at 07:09

## 2024-02-26 RX ADMIN — ASPIRIN 162 MG: 81 TABLET, CHEWABLE ORAL at 09:42

## 2024-02-26 ASSESSMENT — COGNITIVE AND FUNCTIONAL STATUS - GENERAL
STANDING UP FROM CHAIR USING ARMS: A LITTLE
WALKING IN HOSPITAL ROOM: A LITTLE
MOVING TO AND FROM BED TO CHAIR: A LITTLE
MOBILITY SCORE: 20
CLIMB 3 TO 5 STEPS WITH RAILING: A LITTLE
DAILY ACTIVITIY SCORE: 24
CLIMB 3 TO 5 STEPS WITH RAILING: A LITTLE
MOBILITY SCORE: 20
WALKING IN HOSPITAL ROOM: A LITTLE
MOVING TO AND FROM BED TO CHAIR: A LITTLE
STANDING UP FROM CHAIR USING ARMS: A LITTLE

## 2024-02-26 ASSESSMENT — PAIN SCALES - GENERAL
PAINLEVEL_OUTOF10: 5 - MODERATE PAIN
PAINLEVEL_OUTOF10: 0 - NO PAIN

## 2024-02-26 NOTE — PROGRESS NOTES
Physical Therapy    Physical Therapy Evaluation    Patient Name: Nicolas Mcleod  MRN: [id number]  Today's Date: 2/26/2024   Time Calculation  Start Time: 0948  Stop Time: 0958  Time Calculation (min): 10 min    Assessment/Plan   PT Assessment  PT Assessment Results: Decreased mobility, Decreased strength, Pain  Rehab Prognosis: Good  Evaluation/Treatment Tolerance: Patient limited by pain  End of Session Communication: Bedside nurse  Assessment Comment:  (Patient will benefit from additional PT to increase activity tolreance.)  End of Session Patient Position: Bed, 2 rail up, Alarm off, not on at start of session  IP OR SWING BED PT PLAN  Inpatient or Swing Bed: Inpatient  PT Plan  Treatment/Interventions: Transfer training, Gait training, Endurance training  PT Plan: Skilled PT  PT Frequency: 2 times per week  PT Discharge Recommendations: No PT needed after discharge  PT Recommended Transfer Status: Assist x1  PT - OK to Discharge: (once deemed medically appropriate with familiy support)      Subjective   General Visit Information:  General  Reason for Referral: Impaired mobility  Referred By: PT 2/25/24 Miclat  Past Medical History Relevant to Rehab: HLD, HTN, Arthritis, CAD, Neck surgery, hyster, TKA, RA, hearing oss, obesity, oseoporosis  Prior to Session Communication: Bedside nurse  Patient Position Received: Bed, 3 rail up, Alarm off, not on at start of session  General Comment: To ED 2/25/24 for SOB. ; Troponin 21 (upward trend); Flu A +; B (-); C19 (-)  Home Living:  Home Living  Home Living Comments: Per patient  report resides with spouse and daughter in story home with bed/bathroom 2nd floor with handrail. No bathroom on 1st floor, Tub shower has seat and grab bar. Grab bar on toilet. NO DME  Prior Level of Function:  Prior Function Per Pt/Caregiver Report  Prior Function Comments: Per patient report independent in mobiity, ADLS and IADLs without assistive  device. 1 fall when missed last step.  Does not drive. Independent in medication management.  Precautions:  Precautions  Medical Precautions:  (Droplet)  Vital Signs:  Vital Signs  Heart Rate:  (Pre actiivty 74; Post actiivty 110)  SpO2:  (Pre activity 100% on2 liters ; Post 93% on RA, oxygen reapplied)    Objective   Pain:  Pain Assessment  Pain Score: 5 - Moderate pain  Pain Location:  (Right hip more pronounced with weight bearing. also headache)  Cognition:  Cognition  Overall Cognitive Status: Within Functional Limits  Orientation Level: Oriented X4    General Assessments:  General Observation  General Observation: patient lying in bed with oxygen in place. Agreeable to PT.     Activity Tolerance  Endurance:  (Limited by pain)    Static Sitting Balance  Static Sitting-Comment/Number of Minutes: Good  Dynamic Sitting Balance  Dynamic Sitting-Comments: Good    Static Standing Balance  Static Standing-Comment/Number of Minutes: Good  Dynamic Standing Balance  Dynamic Standing-Comments: fair +  Functional Assessments:  Bed Mobility  Bed Mobility:  (Supine <> sit modified independent with side rail)    Transfers  Transfer:  (Transfers sit <> stand at bed level SBA, Patient c/o increased right hip pain. Chronic.)    Ambulation/Gait Training  Ambulation/Gait Training Performed:  (Patient ambulated without assistive device, slight antalgic gait with flexed posture, decreased stride LLE 20' CGA.)  Extremity/Trunk Assessments:  RUE   RUE : Within Functional Limits  LUE   LUE: Within Functional Limits  RLE   RLE :  (AROM HIp/knee/ankle WFL; MMT hip flexors 2/5, knee 4/5, ankle 4/5)  LLE   LLE :  (AROM HIp/knee/ankle WFL; MMT hip flexors 3+/5, knee 4/5, ankle 4/5)  Outcome Measures:  [facility] Basic Mobility  Turning from your back to your side while in a flat bed without using bedrails: None  Moving from lying on your back to sitting on the side of a flat bed without using bedrails: None  Moving to and from bed to chair (including a wheelchair): A little  Standing  "up from a chair using your arms (e.g. wheelchair or bedside chair): A little  To walk in hospital room: A little  Climbing 3-5 steps with railing: A little  Basic Mobility - Total Score: 20    Encounter Problems       Encounter Problems (Active)       PT Problem       Transfers sit <> stand independent  (Progressing)       Start:  02/26/24    Expected End:  03/25/24            Patient to ambulate 3 bouts of 30' independently  (Progressing)       Start:  02/26/24    Expected End:  03/25/24               Pain - Adult              Education Documentation  Mobility Training, taught by Stella Morgan, PT at 2/26/2024 12:01 PM.  Learner: Patient  Readiness: Acceptance  Method: Demonstration, Explanation  Response: Demonstrated Understanding  Comment: Importance of moving to \"lubricate\" joints                "

## 2024-02-26 NOTE — PROGRESS NOTES
Pt independent pta w/ adla and iadls. No medication barriers. Does not use AD to ambulate. Pd home.

## 2024-02-26 NOTE — H&P
History Of Present Illness  Nicolas Mcleod is a 84 y.o. female presenting with sudden episode of shortness of breath.  She spent the night sitting up trying to catch her breath, denies any coughing or phlegm production.  Patient denies any chest pains.  Seen at the emergency room and was noted to have influenza A.  Patient was noted to be slightly hypoxemic upon admission to the emergency room with an SpO2 of 92%.  Troponins were slightly elevated at 12 and 15 but flat not suggestive of acute coronary syndrome.  White blood cell count is normal at 7.4, hemoglobin of 12.9 and platelet of 197,000.  Patient had hypokalemia and was replaced otherwise electrolytes and acid-base balance are normal with a GFR of 89 and creatinine of 0.62.  Magnesium was normal at 1.61 and BNP was 98.  Chest x-ray shows no active lung disease with chronic calcified pulmonary granulomas.  Other medical problems of this patient includes hypertension, history of stroke, arthritis, vitamin D deficiency, coronary artery disease, history of kidney stones,     Past Medical History  Dyslipidemia  Vitamin D3 deficiency  Severe rheumatoid arthritis  History of kidney stones status post parathyroidectomy  Coronary artery disease with left heart catheterization 2014  Hypertension  Osteoporosis    Past Medical History:   Diagnosis Date    Arthritis        Surgical History  Parathyroidectomy  Past Surgical History:   Procedure Laterality Date    HYSTERECTOMY      OTHER SURGICAL HISTORY  07/20/2022    Colonoscopy    OTHER SURGICAL HISTORY  07/20/2022    Neck surgery    OTHER SURGICAL HISTORY  07/20/2022    Breast biopsy    OTHER SURGICAL HISTORY  07/20/2022    Total hysterectomy abdominal    OTHER SURGICAL HISTORY  07/20/2022    Tubal ligation    OTHER SURGICAL HISTORY  07/20/2022    Knee replacement        Social History  She reports that she has quit smoking. Her smoking use included cigarettes. She has never used smokeless tobacco. She reports that  "she does not use drugs. No history on file for alcohol use.    Family History  Mother had hypertension, coronary artery disease with congestive heart failure, degenerative joint disease  Father had ulcers and  of old age  1 grandmother had a stroke  Family History   Problem Relation Name Age of Onset    Other (htn) Mother          Allergies  Shellfish containing products and Statins-hmg-coa reductase inhibitors    Review of Systems  Shortness of breath acutely started not on a chronic basis.  Denies any headache dizziness fainting or passing out and no focal neurological deficits  Denies any chest pains or any palpitation  Despite shortness of breath has been no coughing or phlegm production  Denies any changes in bowel habits  There has been no dysuria  Denies any leg swelling cramping or claudication but has joint aches and pains from rheumatoid arthritis.     Physical Exam  Alert oriented not in distress  Head examination normal pupils equal sclera nonicteric and no facial asymmetry  Lungs are clear without any wheezing crackles or rhonchi despite shortness of breath  Heart was regular with a faint systolic ejection murmur  Abdomen soft nontender no organomegaly no pulsatile mass or bruit  Extremities without any edema peripheral pulses are present  Neurological examination is normal     Last Recorded Vitals  Blood pressure 129/66, pulse 83, temperature 38.2 °C (100.8 °F), resp. rate 22, height 1.575 m (5' 2\"), weight 70.3 kg (154 lb 15.7 oz), SpO2 99 %.    Relevant Results      Current Facility-Administered Medications:     amLODIPine (Norvasc) tablet 10 mg, 10 mg, oral, Daily, Pilo Heath MD    aspirin chewable tablet 162 mg, 162 mg, oral, Daily, Pilo Heath MD    [START ON 2024] cholecalciferol (Vitamin D-3) tablet 2,000 Units, 2,000 Units, oral, Once per day on , Pilo Heath MD    enoxaparin (Lovenox) syringe 40 mg, 40 mg, subcutaneous, Daily, Pilo Heath MD, 40 mg at 24 " 2012    [START ON 2/26/2024] folic acid (Folvite) tablet 1 mg, 1 mg, oral, Weekly, Pilo Heath MD    ibuprofen tablet 800 mg, 800 mg, oral, BID PRN, Pilo Heath MD    ipratropium-albuteroL (Duo-Neb) 0.5-2.5 mg/3 mL nebulizer solution 3 mL, 3 mL, nebulization, q6h, Pilo Heath MD    losartan (Cozaar) 100 mg, hydroCHLOROthiazide (HYDRODiuril) 25 mg, , oral, Daily, Pilo Heath MD    [START ON 2/26/2024] methotrexate (Trexall) tablet 12.5 mg, 12.5 mg, oral, Weekly, Pilo Heath MD    metoprolol succinate XL (Toprol-XL) 24 hr tablet 100 mg, 100 mg, oral, Daily, Pilo Heath MD    oseltamivir (Tamiflu) capsule 75 mg, 75 mg, oral, BID, Pilo Heath MD, 75 mg at 02/25/24 2012    potassium chloride CR (Klor-Con) ER tablet 10 mEq, 10 mEq, oral, Daily, Pilo Heath MD, 10 mEq at 02/25/24 2012    predniSONE (Deltasone) tablet 5 mg, 5 mg, oral, Daily, Pilo Heath MD, 5 mg at 02/25/24 2012     Results for orders placed or performed during the hospital encounter of 02/25/24 (from the past 96 hour(s))   ECG 12 lead   Result Value Ref Range    Ventricular Rate 106 BPM    Atrial Rate 106 BPM    MI Interval 176 ms    QRS Duration 126 ms    QT Interval 354 ms    QTC Calculation(Bazett) 470 ms    P Axis 59 degrees    R Axis 14 degrees    T Axis 91 degrees    QRS Count 17 beats    Q Onset 218 ms    P Onset 130 ms    P Offset 181 ms    T Offset 395 ms    QTC Fredericia 427 ms   CBC and Auto Differential   Result Value Ref Range    WBC 7.4 4.4 - 11.3 x10*3/uL    nRBC 0.0 0.0 - 0.0 /100 WBCs    RBC 4.52 4.00 - 5.20 x10*6/uL    Hemoglobin 12.9 12.0 - 16.0 g/dL    Hematocrit 40.0 36.0 - 46.0 %    MCV 89 80 - 100 fL    MCH 28.5 26.0 - 34.0 pg    MCHC 32.3 32.0 - 36.0 g/dL    RDW 14.6 (H) 11.5 - 14.5 %    Platelets 197 150 - 450 x10*3/uL    Neutrophils % 74.3 40.0 - 80.0 %    Immature Granulocytes %, Automated 0.3 0.0 - 0.9 %    Lymphocytes % 11.5 13.0 - 44.0 %    Monocytes % 12.3 2.0 - 10.0 %    Eosinophils %  1.3 0.0 - 6.0 %    Basophils % 0.3 0.0 - 2.0 %    Neutrophils Absolute 5.51 (H) 1.60 - 5.50 x10*3/uL    Immature Granulocytes Absolute, Automated 0.02 0.00 - 0.50 x10*3/uL    Lymphocytes Absolute 0.85 0.80 - 3.00 x10*3/uL    Monocytes Absolute 0.91 (H) 0.05 - 0.80 x10*3/uL    Eosinophils Absolute 0.10 0.00 - 0.40 x10*3/uL    Basophils Absolute 0.02 0.00 - 0.10 x10*3/uL   Comprehensive Metabolic Panel   Result Value Ref Range    Glucose 96 74 - 99 mg/dL    Sodium 137 136 - 145 mmol/L    Potassium 3.3 (L) 3.5 - 5.3 mmol/L    Chloride 100 98 - 107 mmol/L    Bicarbonate 29 21 - 32 mmol/L    Anion Gap 11 10 - 20 mmol/L    Urea Nitrogen 16 6 - 23 mg/dL    Creatinine 0.62 0.50 - 1.05 mg/dL    eGFR 89 >60 mL/min/1.73m*2    Calcium 9.4 8.6 - 10.3 mg/dL    Albumin 3.8 3.4 - 5.0 g/dL    Alkaline Phosphatase 55 33 - 136 U/L    Total Protein 7.5 6.4 - 8.2 g/dL    AST 23 9 - 39 U/L    Bilirubin, Total 0.5 0.0 - 1.2 mg/dL    ALT 11 7 - 45 U/L   Magnesium   Result Value Ref Range    Magnesium 1.64 1.60 - 2.40 mg/dL   Protime-INR   Result Value Ref Range    Protime 12.5 9.8 - 12.8 seconds    INR 1.1 0.9 - 1.1   B-Type Natriuretic Peptide   Result Value Ref Range    BNP 98 0 - 99 pg/mL   Troponin I, High Sensitivity, Initial   Result Value Ref Range    Troponin I, High Sensitivity 12 0 - 13 ng/L   Sars-CoV-2 and Influenza A/B PCR   Result Value Ref Range    Flu A Result Detected (A) Not Detected    Flu B Result Not Detected Not Detected    Coronavirus 2019, PCR Not Detected Not Detected   ECG 12 lead   Result Value Ref Range    Ventricular Rate 106 BPM    Atrial Rate 106 BPM    KS Interval 172 ms    QRS Duration 132 ms    QT Interval 356 ms    QTC Calculation(Bazett) 472 ms    P Axis 67 degrees    R Axis -6 degrees    T Axis 91 degrees    QRS Count 18 beats    Q Onset 220 ms    P Onset 134 ms    P Offset 185 ms    T Offset 398 ms    QTC Fredericia 430 ms   Troponin I, High Sensitivity   Result Value Ref Range    Troponin I, High  Sensitivity 15 (H) 0 - 13 ng/L   B-type natriuretic peptide   Result Value Ref Range     (H) 0 - 99 pg/mL   Troponin I, High Sensitivity   Result Value Ref Range    Troponin I, High Sensitivity 21 (H) 0 - 13 ng/L    ECG 12 lead    Result Date: 2/25/2024  Sinus tachycardia Left bundle branch block Abnormal ECG When compared with ECG of 25-FEB-2024 07:34, (unconfirmed) No significant change was found See ED provider note for full interpretation and clinical correlation Confirmed by Brittany Guzman ([id number]) on 2/25/2024 1:37:02 PM    ECG 12 lead    Result Date: 2/25/2024  Sinus tachycardia Left bundle branch block Abnormal ECG When compared with ECG of 17-JUL-2023 09:53, Vent. rate has increased BY  46 BPM Questionable change in QRS axis T wave inversion no longer evident in Inferior leads T wave inversion now evident in Lateral leads See ED provider note for full interpretation and clinical correlation Confirmed by Brittany Guzman ([id number]) on 2/25/2024 1:36:53 PM    XR chest 1 view    Result Date: 2/25/2024  STUDY: Chest Radiograph;  2/25/2024 8:37 AM INDICATION: Chest pain. COMPARISON: CT chest 1/3/2024.  CXR 3/16/2019. ACCESSION NUMBER(S): [id number] ORDERING CLINICIAN: BRITTANY CARBALLO TECHNIQUE:  Frontal chest was obtained at 08:37 hours. FINDINGS: CARDIOMEDIASTINAL SILHOUETTE: Cardiomediastinal silhouette is normal in size and configuration. Aortic atherosclerosis which is mildly tortuous.  LUNGS: Multiple scattered granulomas in the lungs. No evidence for acute consolidations or pleural effusions.  ABDOMEN: No remarkable upper abdominal findings.  BONES: No acute osseous changes. Degenerative changes bilateral shoulders and spine.    No acute disease. Calcified pulmonary granulomas observed throughout. Signed by Mu Valle MD       Assessment/Plan   Principal Problem:    Influenza      Influenza A with shortness of breath.  Treated with Tamiflu, DuoNeb nebulizers and symptomatic treatment, supplemental  oxygen  Rheumatoid arthritis on methotrexate and Remicade  Vitamin D3 deficiency on vitamin D3 2000 units daily  Hypertension on Metroprolol succinate  mg daily, losartan hydrochlorothiazide 100/25 once a day  DVT prophylaxis Lovenox 40 mg subcu daily       I spent 60 minutes in the professional and overall care of this patient.      Pilo Heath MD FACP

## 2024-02-27 LAB
ALBUMIN SERPL BCP-MCNC: 2.9 G/DL (ref 3.4–5)
ALP SERPL-CCNC: 41 U/L (ref 33–136)
ALT SERPL W P-5'-P-CCNC: 8 U/L (ref 7–45)
ANION GAP SERPL CALC-SCNC: 10 MMOL/L (ref 10–20)
AST SERPL W P-5'-P-CCNC: 23 U/L (ref 9–39)
BASOPHILS # BLD AUTO: 0.02 X10*3/UL (ref 0–0.1)
BASOPHILS NFR BLD AUTO: 0.5 %
BILIRUB SERPL-MCNC: 0.3 MG/DL (ref 0–1.2)
BUN SERPL-MCNC: 15 MG/DL (ref 6–23)
CALCIUM SERPL-MCNC: 8.9 MG/DL (ref 8.6–10.3)
CHLORIDE SERPL-SCNC: 100 MMOL/L (ref 98–107)
CO2 SERPL-SCNC: 33 MMOL/L (ref 21–32)
CREAT SERPL-MCNC: 0.58 MG/DL (ref 0.5–1.05)
EGFRCR SERPLBLD CKD-EPI 2021: 90 ML/MIN/1.73M*2
EOSINOPHIL # BLD AUTO: 0.05 X10*3/UL (ref 0–0.4)
EOSINOPHIL NFR BLD AUTO: 1.2 %
ERYTHROCYTE [DISTWIDTH] IN BLOOD BY AUTOMATED COUNT: 14.6 % (ref 11.5–14.5)
GLUCOSE SERPL-MCNC: 64 MG/DL (ref 74–99)
HCT VFR BLD AUTO: 37 % (ref 36–46)
HGB BLD-MCNC: 11.5 G/DL (ref 12–16)
IMM GRANULOCYTES # BLD AUTO: 0 X10*3/UL (ref 0–0.5)
IMM GRANULOCYTES NFR BLD AUTO: 0 % (ref 0–0.9)
LYMPHOCYTES # BLD AUTO: 2.24 X10*3/UL (ref 0.8–3)
LYMPHOCYTES NFR BLD AUTO: 54.9 %
MCH RBC QN AUTO: 28.1 PG (ref 26–34)
MCHC RBC AUTO-ENTMCNC: 31.1 G/DL (ref 32–36)
MCV RBC AUTO: 91 FL (ref 80–100)
MONOCYTES # BLD AUTO: 0.77 X10*3/UL (ref 0.05–0.8)
MONOCYTES NFR BLD AUTO: 18.9 %
NEUTROPHILS # BLD AUTO: 1 X10*3/UL (ref 1.6–5.5)
NEUTROPHILS NFR BLD AUTO: 24.5 %
NRBC BLD-RTO: 0 /100 WBCS (ref 0–0)
PLATELET # BLD AUTO: 143 X10*3/UL (ref 150–450)
POTASSIUM SERPL-SCNC: 3.9 MMOL/L (ref 3.5–5.3)
PROT SERPL-MCNC: 6.3 G/DL (ref 6.4–8.2)
RBC # BLD AUTO: 4.09 X10*6/UL (ref 4–5.2)
SODIUM SERPL-SCNC: 139 MMOL/L (ref 136–145)
WBC # BLD AUTO: 4.1 X10*3/UL (ref 4.4–11.3)

## 2024-02-27 PROCEDURE — 36415 COLL VENOUS BLD VENIPUNCTURE: CPT | Performed by: INTERNAL MEDICINE

## 2024-02-27 PROCEDURE — G0378 HOSPITAL OBSERVATION PER HR: HCPCS

## 2024-02-27 PROCEDURE — 2500000004 HC RX 250 GENERAL PHARMACY W/ HCPCS (ALT 636 FOR OP/ED): Performed by: INTERNAL MEDICINE

## 2024-02-27 PROCEDURE — 2500000001 HC RX 250 WO HCPCS SELF ADMINISTERED DRUGS (ALT 637 FOR MEDICARE OP): Performed by: INTERNAL MEDICINE

## 2024-02-27 PROCEDURE — 80053 COMPREHEN METABOLIC PANEL: CPT | Performed by: INTERNAL MEDICINE

## 2024-02-27 PROCEDURE — 1210000001 HC SEMI-PRIVATE ROOM DAILY

## 2024-02-27 PROCEDURE — 2500000004 HC RX 250 GENERAL PHARMACY W/ HCPCS (ALT 636 FOR OP/ED): Performed by: PHARMACIST

## 2024-02-27 PROCEDURE — 2500000002 HC RX 250 W HCPCS SELF ADMINISTERED DRUGS (ALT 637 FOR MEDICARE OP, ALT 636 FOR OP/ED): Performed by: INTERNAL MEDICINE

## 2024-02-27 PROCEDURE — 85025 COMPLETE CBC W/AUTO DIFF WBC: CPT | Performed by: INTERNAL MEDICINE

## 2024-02-27 RX ORDER — GUAIFENESIN/DEXTROMETHORPHAN 100-10MG/5
5 SYRUP ORAL EVERY 4 HOURS PRN
Status: DISCONTINUED | OUTPATIENT
Start: 2024-02-27 | End: 2024-03-01 | Stop reason: HOSPADM

## 2024-02-27 RX ORDER — FOLIC ACID 1 MG/1
1 TABLET ORAL
Status: DISCONTINUED | OUTPATIENT
Start: 2024-03-02 | End: 2024-03-01 | Stop reason: HOSPADM

## 2024-02-27 RX ADMIN — METHOTREXATE 12.5 MG: 2.5 TABLET ORAL at 18:36

## 2024-02-27 RX ADMIN — OSELTAMIVIR PHOSPHATE 75 MG: 75 CAPSULE ORAL at 08:04

## 2024-02-27 RX ADMIN — IBUPROFEN 800 MG: 800 TABLET, FILM COATED ORAL at 12:37

## 2024-02-27 RX ADMIN — ENOXAPARIN SODIUM 40 MG: 40 INJECTION SUBCUTANEOUS at 08:03

## 2024-02-27 RX ADMIN — METOPROLOL SUCCINATE 100 MG: 50 TABLET, EXTENDED RELEASE ORAL at 08:03

## 2024-02-27 RX ADMIN — PREDNISONE 5 MG: 5 TABLET ORAL at 08:04

## 2024-02-27 RX ADMIN — OSELTAMIVIR PHOSPHATE 75 MG: 75 CAPSULE ORAL at 20:40

## 2024-02-27 RX ADMIN — ASPIRIN 162 MG: 81 TABLET, CHEWABLE ORAL at 08:04

## 2024-02-27 RX ADMIN — POTASSIUM CHLORIDE 10 MEQ: 750 TABLET, EXTENDED RELEASE ORAL at 08:04

## 2024-02-27 ASSESSMENT — PAIN DESCRIPTION - LOCATION: LOCATION: HEAD

## 2024-02-27 ASSESSMENT — PAIN - FUNCTIONAL ASSESSMENT
PAIN_FUNCTIONAL_ASSESSMENT: 0-10

## 2024-02-27 ASSESSMENT — COGNITIVE AND FUNCTIONAL STATUS - GENERAL
MOVING TO AND FROM BED TO CHAIR: A LITTLE
CLIMB 3 TO 5 STEPS WITH RAILING: A LITTLE
WALKING IN HOSPITAL ROOM: A LITTLE
STANDING UP FROM CHAIR USING ARMS: A LITTLE
DAILY ACTIVITIY SCORE: 24
MOBILITY SCORE: 20

## 2024-02-27 ASSESSMENT — PAIN SCALES - GENERAL
PAINLEVEL_OUTOF10: 0 - NO PAIN
PAINLEVEL_OUTOF10: 4
PAINLEVEL_OUTOF10: 0 - NO PAIN

## 2024-02-27 ASSESSMENT — PAIN DESCRIPTION - ORIENTATION: ORIENTATION: LEFT

## 2024-02-27 NOTE — DOCUMENTATION CLARIFICATION NOTE
"    PATIENT:               LILIAN MCCARTHY  ACCT #:                  [id number]  MRN:                       [id number]  :                       1940  ADMIT DATE:       2024 7:54 AM  DISCH DATE:  RESPONDING PROVIDER #:        [id number]          PROVIDER RESPONSE TEXT:    Non-ischemic myocardial injury 2/2 troponin leak with decreased clearance    CDI QUERY TEXT:    [facility]    Instruction:    Based on your assessment of the patient and the clinical information, please provide the requested documentation by clicking on the appropriate radio button and enter any additional information if prompted.    Question: Is there a diagnosis indicative of the patient elevated Troponins and symptoms    When answering this query, please exercise your independent professional judgment. The fact that a question is being asked, does not imply that any particular answer is desired or expected.    The patient's clinical indicators include:  Clinical Information:  The patient is an 84 year old female who presented to the ED with SOB.  She tested positive for influenza A.  Her PMH includes HTN, HLD, RA, CAD and a hx of stroke.    Clinical Indicators:    DPN  \"Troponins are elevated but flat at 21, 15 and 12 most likely troponin leak with decreased clearance\"    EKG  at 748 \"Sinus tachycardia. Left bundle branch block. Abnormal ECG\"    Treatment:  Troponin trending, EKG    Risk Factors:  Influenza A  Options provided:  -- Non-ischemic myocardial injury 2/2 troponin leak with decreased clearance  -- Troponin elevation due to other, Please specify additional information below  -- Other - I will add my own diagnosis  -- Refer to Clinical Documentation Reviewer    Query created by: Sheila Rain on 2024 10:01 AM      Electronically signed by:  SEBAS CLEMENTS MD 2024 4:26 PM          "

## 2024-02-27 NOTE — PROGRESS NOTES
"Nicolas Mcleod is a 84 y.o. female on day 1 of admission presenting with Influenza.    Subjective   Patient with influenza A.  Still complaining of shortness of breath but improved with oxygen.  Patient did not seem to be in any severe distress and was quite comfortable lying down in bed.  Denies any chest pains or any palpitation.  Complains of back and right hip pain most likely from arthritis.  Bowels are moving well and no dysuria.  Patient able to ambulate to the bathroom without help using a walker all the physical therapy is on the case.  Denies any headache dizziness or fainting and otherwise 12 point review of systems negative.       Objective   Blood pressures are low today on multiple blood pressure medications, will modify blood pressure medications and put parameters  BNP is 273, patient diuresing well and no signs of congestive heart failure.  No edema  Troponins are elevated but flat at 21, 15 and 12 most likely troponin leak with decreased clearance      Physical Exam  Alert oriented not in distress.  Specifically no unusual shortness of breath clinically.  She claims however that she is short of breath on going to the bathroom.  Head examination normal pupils are equal sclera nonicteric no facial asymmetry  Neck veins flat without bruit  Lungs are clear without any wheezing crackles or rhonchi despite shortness of breath  Heart is regular with a faint systolic ejection murmur  Abdomen soft and nontender no organomegaly no pulsatile mass or bruit  Extremities without any edema with good peripheral pulses  Neurological examination is normal    Last Recorded Vitals  Blood pressure 94/52, pulse 68, temperature 36.6 °C (97.9 °F), resp. rate 14, height 1.575 m (5' 2\"), weight 70.3 kg (154 lb 15.7 oz), SpO2 98 %.  Intake/Output last 3 Shifts:  I/O last 3 completed shifts:  In: - (0 mL/kg)   Out: 1 (0 mL/kg) [Urine:1 (0 mL/kg/hr)]  Weight: 70.3 kg     Relevant Results          Current Facility-Administered " Medications:     [START ON 2/27/2024] amLODIPine (Norvasc) tablet 5 mg, 5 mg, oral, BID, Pilo Heath MD    aspirin chewable tablet 162 mg, 162 mg, oral, Daily, Pilo Heath MD, 162 mg at 02/26/24 0942    cholecalciferol (Vitamin D-3) tablet 2,000 Units, 2,000 Units, oral, Once per day on Mon Thu, Pilo Heath MD, 2,000 Units at 02/26/24 0942    enoxaparin (Lovenox) syringe 40 mg, 40 mg, subcutaneous, Daily, Pilo Heath MD, 40 mg at 02/26/24 0941    folic acid (Folvite) tablet 1 mg, 1 mg, oral, Weekly, Pilo Heath MD    ibuprofen tablet 800 mg, 800 mg, oral, BID PRN, Pilo Heath MD    ipratropium-albuteroL (Duo-Neb) 0.5-2.5 mg/3 mL nebulizer solution 3 mL, 3 mL, nebulization, q2h PRN, Pilo Heath MD    [Held by provider] losartan (Cozaar) 100 mg, hydroCHLOROthiazide (HYDRODiuril) 25 mg, , oral, Daily, Pilo Heath MD, New Bag at 02/26/24 0947    [START ON 2/27/2024] methotrexate (Trexall) tablet 12.5 mg, 12.5 mg, oral, Weekly, Christal Winters, formerly [facility]    metoprolol succinate XL (Toprol-XL) 24 hr tablet 100 mg, 100 mg, oral, Daily, Pilo Heath MD, 100 mg at 02/26/24 0942    oseltamivir (Tamiflu) capsule 75 mg, 75 mg, oral, BID, Pilo Heath MD, 75 mg at 02/26/24 2002    potassium chloride CR (Klor-Con) ER tablet 10 mEq, 10 mEq, oral, Daily, Pilo Heath MD, 10 mEq at 02/26/24 0942    predniSONE (Deltasone) tablet 5 mg, 5 mg, oral, Daily, Pilo Heath MD, 5 mg at 02/26/24 0943   No results found for this or any previous visit (from the past 24 hour(s)).   ECG 12 lead    Result Date: 2/25/2024  Sinus tachycardia Left bundle branch block Abnormal ECG When compared with ECG of 25-FEB-2024 07:34, (unconfirmed) No significant change was found See ED provider note for full interpretation and clinical correlation Confirmed by Brittany Guzman ([id number]) on 2/25/2024 1:37:02 PM    ECG 12 lead    Result Date: 2/25/2024  Sinus tachycardia Left bundle branch block Abnormal ECG When compared with  ECG of 17-JUL-2023 09:53, Vent. rate has increased BY  46 BPM Questionable change in QRS axis T wave inversion no longer evident in Inferior leads T wave inversion now evident in Lateral leads See ED provider note for full interpretation and clinical correlation Confirmed by Brittany Guzman ([id number]) on 2/25/2024 1:36:53 PM    XR chest 1 view    Result Date: 2/25/2024  STUDY: Chest Radiograph;  2/25/2024 8:37 AM INDICATION: Chest pain. COMPARISON: CT chest 1/3/2024.  CXR 3/16/2019. ACCESSION NUMBER(S): [id number] ORDERING CLINICIAN: BRITTANY CARBALLO TECHNIQUE:  Frontal chest was obtained at 08:37 hours. FINDINGS: CARDIOMEDIASTINAL SILHOUETTE: Cardiomediastinal silhouette is normal in size and configuration. Aortic atherosclerosis which is mildly tortuous.  LUNGS: Multiple scattered granulomas in the lungs. No evidence for acute consolidations or pleural effusions.  ABDOMEN: No remarkable upper abdominal findings.  BONES: No acute osseous changes. Degenerative changes bilateral shoulders and spine.    No acute disease. Calcified pulmonary granulomas observed throughout. Signed by Mu Valle MD             Assessment/Plan   Principal Problem:    Influenza    Influenza A with shortness of breath, on Tamiflu, DuoNeb nebulizers, supplemental oxygen and symptomatic treatment  Rheumatoid arthritis on methotrexate and Remicade  Vitamin D3 deficiency on vitamin D3 2000 units daily  History of hypertension currently hypotensive.  Losartan hydrochlorothiazide was held, amlodipine was split into twice daily with parameters, and metoprolol was also placed on parameters  DVT prophylaxis on Lovenox 40 mg subcu daily       I spent 30 minutes in the professional and overall care of this patient.      Pilo Heath MD FACP

## 2024-02-28 ENCOUNTER — APPOINTMENT (OUTPATIENT)
Dept: RADIOLOGY | Facility: HOSPITAL | Age: 84
DRG: 194 | End: 2024-02-28
Payer: MEDICARE

## 2024-02-28 PROCEDURE — 71046 X-RAY EXAM CHEST 2 VIEWS: CPT | Performed by: RADIOLOGY

## 2024-02-28 PROCEDURE — G0378 HOSPITAL OBSERVATION PER HR: HCPCS

## 2024-02-28 PROCEDURE — 2500000001 HC RX 250 WO HCPCS SELF ADMINISTERED DRUGS (ALT 637 FOR MEDICARE OP): Performed by: INTERNAL MEDICINE

## 2024-02-28 PROCEDURE — 2500000004 HC RX 250 GENERAL PHARMACY W/ HCPCS (ALT 636 FOR OP/ED): Performed by: INTERNAL MEDICINE

## 2024-02-28 PROCEDURE — 1210000001 HC SEMI-PRIVATE ROOM DAILY

## 2024-02-28 PROCEDURE — 71046 X-RAY EXAM CHEST 2 VIEWS: CPT

## 2024-02-28 PROCEDURE — 97530 THERAPEUTIC ACTIVITIES: CPT | Mod: GP,CQ

## 2024-02-28 PROCEDURE — 2500000002 HC RX 250 W HCPCS SELF ADMINISTERED DRUGS (ALT 637 FOR MEDICARE OP, ALT 636 FOR OP/ED): Performed by: INTERNAL MEDICINE

## 2024-02-28 RX ADMIN — ENOXAPARIN SODIUM 40 MG: 40 INJECTION SUBCUTANEOUS at 09:09

## 2024-02-28 RX ADMIN — SODIUM CHLORIDE 500 ML: 9 INJECTION, SOLUTION INTRAVENOUS at 00:55

## 2024-02-28 RX ADMIN — METOPROLOL SUCCINATE 100 MG: 50 TABLET, EXTENDED RELEASE ORAL at 09:09

## 2024-02-28 RX ADMIN — PREDNISONE 5 MG: 5 TABLET ORAL at 09:09

## 2024-02-28 RX ADMIN — OSELTAMIVIR PHOSPHATE 75 MG: 75 CAPSULE ORAL at 09:09

## 2024-02-28 RX ADMIN — ASPIRIN 162 MG: 81 TABLET, CHEWABLE ORAL at 09:09

## 2024-02-28 RX ADMIN — POTASSIUM CHLORIDE 10 MEQ: 750 TABLET, EXTENDED RELEASE ORAL at 09:09

## 2024-02-28 RX ADMIN — OSELTAMIVIR PHOSPHATE 75 MG: 75 CAPSULE ORAL at 21:32

## 2024-02-28 ASSESSMENT — COGNITIVE AND FUNCTIONAL STATUS - GENERAL
WALKING IN HOSPITAL ROOM: A LITTLE
MOVING TO AND FROM BED TO CHAIR: A LITTLE
MOBILITY SCORE: 20
MOBILITY SCORE: 21
STANDING UP FROM CHAIR USING ARMS: A LITTLE
CLIMB 3 TO 5 STEPS WITH RAILING: A LITTLE
MOVING TO AND FROM BED TO CHAIR: A LITTLE
WALKING IN HOSPITAL ROOM: A LITTLE
CLIMB 3 TO 5 STEPS WITH RAILING: A LITTLE
DAILY ACTIVITIY SCORE: 24

## 2024-02-28 ASSESSMENT — PAIN - FUNCTIONAL ASSESSMENT
PAIN_FUNCTIONAL_ASSESSMENT: 0-10

## 2024-02-28 ASSESSMENT — PAIN SCALES - GENERAL
PAINLEVEL_OUTOF10: 0 - NO PAIN
PAINLEVEL_OUTOF10: 0 - NO PAIN
PAINLEVEL_OUTOF10: 8

## 2024-02-28 NOTE — PROGRESS NOTES
Physical Therapy    Physical Therapy Treatment    Patient Name: Nicolas Mcleod  MRN: [id number]  Today's Date: 2/28/2024  Time Calculation  Start Time: 1033  Stop Time: 1050  Time Calculation (min): 17 min       Assessment/Plan   End of Session Communication: Bedside nurse  Assessment Comment: Patient presented with good effort throughout todays session. Due to fatigue and feeling tired frequent rest breaks provided. Call light and all needs in reach.  End of Session Patient Position: Up in chair, Alarm off, not on at start of session      General Visit Information:   PT  Visit  PT Received On: 02/28/24  General  Prior to Session Communication: Bedside nurse  Patient Position Received: Up in chair, Alarm off, not on at start of session  General Comment: Pt agreeable to therapy although states very tired unsure how much she can tolerate. Did not sleep well last night.  Subjective     Precautions:  Precautions  Medical Precautions:  (Droplet)       Objective     Pain:  Pain Assessment  Pain Assessment: 0-10  Pain Score: 8  Pain Type:  (Pt described pain in center of buttock radiating down posterior LE on R side.)         Treatments:  Therapeutic Exercise  Therapeutic Exercise Performed: Yes  Therapeutic Exercise Activity 1: seated, BLE LAQ, marches, standing hip ABD/ADD with UE support on counter 15x with standing therex poor posture and difficulty performing without trunk compensation or bending LE performing task           Ambulation/Gait Training  Ambulation/Gait Training Performed: Yes  Ambulation/Gait Training 1  Surface 1: Level tile  Device 1:  (no AD)  Assistance 1: Close supervision  Comments/Distance (ft) 1: Pt ambulated 15' x1 and then an additional 35' x1 SUP. Pt demonstrates  antalgic gait with short stride length due to increasing pain in R hip region. VC to correct. Ambulates slowly.  Transfers  Transfer: Yes  Transfers 2  Transfer From 2: Chair with arms to  Transfer to 2: Stand  Technique 2: Stand to  sit, Sit to stand  Transfer Device 2:  (no AD)  Transfer Level of Assistance 2: Independent  Trials/Comments 2: Pt performed STS from recliner<>standing position. No VC required for sequencing. Good eccentric control.          Outcome Measures:  [facility] Basic Mobility  Turning from your back to your side while in a flat bed without using bedrails: None  Moving from lying on your back to sitting on the side of a flat bed without using bedrails: None  Moving to and from bed to chair (including a wheelchair): A little  Standing up from a chair using your arms (e.g. wheelchair or bedside chair): None  To walk in hospital room: A little  Climbing 3-5 steps with railing: A little  Basic Mobility - Total Score: 21  Education Documentation  Mobility Training, taught by Penny Jaimes PTA at 2/28/2024  2:50 PM.  Learner: Patient  Readiness: Acceptance  Method: Explanation, Demonstration  Response: Verbalizes Understanding, Needs Reinforcement    Education Comments  No comments found.        EDUCATION:     Encounter Problems       Encounter Problems (Active)       PT Problem       Transfers sit <> stand independent  (Progressing)       Start:  02/26/24    Expected End:  03/25/24            Patient to ambulate 3 bouts of 30' independently  (Progressing)       Start:  02/26/24    Expected End:  03/25/24               Pain - Adult

## 2024-02-28 NOTE — PROGRESS NOTES
"Nicolas Mcleod is a 84 y.o. female on day 2 of admission presenting with Influenza.    Subjective   Patient claims that she has been breathing better but lately has been coughing a lot with a large amount of phlegm production.  There is been no fever and chills.  Appetite has been good, no nausea or vomiting.  Denies any chest pains or any palpitation.  Patient ambulating around the room and seems to be oxygenating well.       Objective   Kidney function remains normal with a creatinine of 0.58 and GFR greater than 60  Blood pressures have been low lately last month 93/50 heart rate of 61 currently blood pressure medications with parameters and has been modified.  Patient oxygenating well with oxygen level of 95 to 97% on 2 L of oxygen via nasal cannula.  1 reading at 89%.  Liver function tests are normal  BNP at 277      Physical Exam  Alert oriented and does not seem to be in distress.  When seen tonight she was lying down flat on her back without any shortness of breath but has been coughing with a large amount of phlegm collection along the bedside  Head examination benign pupils equal sclera nonicteric no facial asymmetry  Lungs are clear with occasional rhonchi but without any wheezing or crackles  Heart regular with a faint systolic ejection murmur  Abdomen soft and nontender no organomegaly no pulsatile mass no bruit with good bowel sounds and no guarding  Extremities without edema with good peripheral pulses  Neurological examination benign    Last Recorded Vitals  Blood pressure 94/51, pulse 67, temperature 36.2 °C (97.2 °F), resp. rate 18, height 1.575 m (5' 2\"), weight 70.3 kg (154 lb 15.7 oz), SpO2 (!) 89 %.  Intake/Output last 3 Shifts:  I/O last 3 completed shifts:  In: 280 (4 mL/kg) [P.O.:280]  Out: - (0 mL/kg)   Weight: 70.3 kg     Relevant Results          Current Facility-Administered Medications:     amLODIPine (Norvasc) tablet 5 mg, 5 mg, oral, BID, Pilo Heath MD    aspirin chewable tablet " 162 mg, 162 mg, oral, Daily, Pilo Heath MD, 162 mg at 02/27/24 0804    cholecalciferol (Vitamin D-3) tablet 2,000 Units, 2,000 Units, oral, Once per day on Mon Thu, Pilo Heath MD, 2,000 Units at 02/26/24 0942    dextromethorphan-guaifenesin (Robitussin DM)  mg/5 mL oral liquid 5 mL, 5 mL, oral, q4h PRN, Pilo Heath MD    enoxaparin (Lovenox) syringe 40 mg, 40 mg, subcutaneous, Daily, Pilo Heath MD, 40 mg at 02/27/24 0803    [START ON 3/2/2024] folic acid (Folvite) tablet 1 mg, 1 mg, oral, Weekly, Pilo Heath MD    ibuprofen tablet 800 mg, 800 mg, oral, BID PRN, Pilo Heath MD, 800 mg at 02/27/24 1237    ipratropium-albuteroL (Duo-Neb) 0.5-2.5 mg/3 mL nebulizer solution 3 mL, 3 mL, nebulization, q2h PRN, Pilo Heath MD    [Held by provider] losartan (Cozaar) 100 mg, hydroCHLOROthiazide (HYDRODiuril) 25 mg, , oral, Daily, Pilo Heath MD, New Bag at 02/26/24 0947    methotrexate (Trexall) tablet 12.5 mg, 12.5 mg, oral, Weekly, Christal Winters, [facility], 12.5 mg at 02/27/24 1836    metoprolol succinate XL (Toprol-XL) 24 hr tablet 100 mg, 100 mg, oral, Daily, Pilo Heath MD, 100 mg at 02/27/24 0803    oseltamivir (Tamiflu) capsule 75 mg, 75 mg, oral, BID, Pilo Heath MD, 75 mg at 02/27/24 2040    potassium chloride CR (Klor-Con) ER tablet 10 mEq, 10 mEq, oral, Daily, Pilo Heath MD, 10 mEq at 02/27/24 0804    predniSONE (Deltasone) tablet 5 mg, 5 mg, oral, Daily, Pilo Heath MD, 5 mg at 02/27/24 0804    sodium chloride 0.9 % bolus 500 mL, 500 mL, intravenous, Once, Pilo Heath MD   Results for orders placed or performed during the hospital encounter of 02/25/24 (from the past 24 hour(s))   Comprehensive metabolic panel   Result Value Ref Range    Glucose 64 (L) 74 - 99 mg/dL    Sodium 139 136 - 145 mmol/L    Potassium 3.9 3.5 - 5.3 mmol/L    Chloride 100 98 - 107 mmol/L    Bicarbonate 33 (H) 21 - 32 mmol/L    Anion Gap 10 10 - 20 mmol/L    Urea Nitrogen 15 6 - 23  mg/dL    Creatinine 0.58 0.50 - 1.05 mg/dL    eGFR 90 >60 mL/min/1.73m*2    Calcium 8.9 8.6 - 10.3 mg/dL    Albumin 2.9 (L) 3.4 - 5.0 g/dL    Alkaline Phosphatase 41 33 - 136 U/L    Total Protein 6.3 (L) 6.4 - 8.2 g/dL    AST 23 9 - 39 U/L    Bilirubin, Total 0.3 0.0 - 1.2 mg/dL    ALT 8 7 - 45 U/L   CBC and Auto Differential   Result Value Ref Range    WBC 4.1 (L) 4.4 - 11.3 x10*3/uL    nRBC 0.0 0.0 - 0.0 /100 WBCs    RBC 4.09 4.00 - 5.20 x10*6/uL    Hemoglobin 11.5 (L) 12.0 - 16.0 g/dL    Hematocrit 37.0 36.0 - 46.0 %    MCV 91 80 - 100 fL    MCH 28.1 26.0 - 34.0 pg    MCHC 31.1 (L) 32.0 - 36.0 g/dL    RDW 14.6 (H) 11.5 - 14.5 %    Platelets 143 (L) 150 - 450 x10*3/uL    Neutrophils % 24.5 40.0 - 80.0 %    Immature Granulocytes %, Automated 0.0 0.0 - 0.9 %    Lymphocytes % 54.9 13.0 - 44.0 %    Monocytes % 18.9 2.0 - 10.0 %    Eosinophils % 1.2 0.0 - 6.0 %    Basophils % 0.5 0.0 - 2.0 %    Neutrophils Absolute 1.00 (L) 1.60 - 5.50 x10*3/uL    Immature Granulocytes Absolute, Automated 0.00 0.00 - 0.50 x10*3/uL    Lymphocytes Absolute 2.24 0.80 - 3.00 x10*3/uL    Monocytes Absolute 0.77 0.05 - 0.80 x10*3/uL    Eosinophils Absolute 0.05 0.00 - 0.40 x10*3/uL    Basophils Absolute 0.02 0.00 - 0.10 x10*3/uL    No results found.            Assessment/Plan   Principal Problem:    Influenza    Influenza A with shortness of breath currently oxygenating well with average SpO2 between 92 to 95% on 2 L via nasal cannula.  1 reading with 89%.  Patient denies any unusual shortness of breath and is breathing much better clinically.  She has been coughing lately however with large amounts of phlegm.  Started on Robitussin DM.  Currently still on Tamiflu and DuoNeb nebulizers as well as supplemental oxygen as well as prednisone 5 mg daily  Rheumatoid arthritis on methotrexate, 5 mg daily of prednisone and Remicade being followed up by Dr. Thornton  Vitamin D3 deficiency on vitamin D3 2000 units daily  History of hypertension  currently hypotensive losartan hydrochlorothiazide was held and amlodipine split with parameters likewise parameters on metoprolol, will try to give a bolus of 500 cc normal saline to see if blood pressure will go up.  DVT prophylaxis on Lovenox 40 mg subcu daily         I spent 30 minutes in the professional and overall care of this patient.      Pilo Heath MD FACP

## 2024-02-29 LAB
ANION GAP SERPL CALC-SCNC: 9 MMOL/L (ref 10–20)
BASOPHILS # BLD AUTO: 0.01 X10*3/UL (ref 0–0.1)
BASOPHILS NFR BLD AUTO: 0.3 %
BUN SERPL-MCNC: 16 MG/DL (ref 6–23)
CALCIUM SERPL-MCNC: 9.3 MG/DL (ref 8.6–10.3)
CHLORIDE SERPL-SCNC: 101 MMOL/L (ref 98–107)
CO2 SERPL-SCNC: 33 MMOL/L (ref 21–32)
CREAT SERPL-MCNC: 0.62 MG/DL (ref 0.5–1.05)
EGFRCR SERPLBLD CKD-EPI 2021: 89 ML/MIN/1.73M*2
EOSINOPHIL # BLD AUTO: 0.03 X10*3/UL (ref 0–0.4)
EOSINOPHIL NFR BLD AUTO: 0.9 %
ERYTHROCYTE [DISTWIDTH] IN BLOOD BY AUTOMATED COUNT: 14 % (ref 11.5–14.5)
GLUCOSE SERPL-MCNC: 107 MG/DL (ref 74–99)
HCT VFR BLD AUTO: 37.9 % (ref 36–46)
HGB BLD-MCNC: 11.8 G/DL (ref 12–16)
HOLD SPECIMEN: NORMAL
IMM GRANULOCYTES # BLD AUTO: 0.01 X10*3/UL (ref 0–0.5)
IMM GRANULOCYTES NFR BLD AUTO: 0.3 % (ref 0–0.9)
LYMPHOCYTES # BLD AUTO: 1.46 X10*3/UL (ref 0.8–3)
LYMPHOCYTES NFR BLD AUTO: 45.2 %
MCH RBC QN AUTO: 28.2 PG (ref 26–34)
MCHC RBC AUTO-ENTMCNC: 31.1 G/DL (ref 32–36)
MCV RBC AUTO: 91 FL (ref 80–100)
MONOCYTES # BLD AUTO: 0.19 X10*3/UL (ref 0.05–0.8)
MONOCYTES NFR BLD AUTO: 5.9 %
NEUTROPHILS # BLD AUTO: 1.53 X10*3/UL (ref 1.6–5.5)
NEUTROPHILS NFR BLD AUTO: 47.4 %
NRBC BLD-RTO: 0 /100 WBCS (ref 0–0)
PLATELET # BLD AUTO: 161 X10*3/UL (ref 150–450)
POTASSIUM SERPL-SCNC: 4.1 MMOL/L (ref 3.5–5.3)
RBC # BLD AUTO: 4.19 X10*6/UL (ref 4–5.2)
SODIUM SERPL-SCNC: 139 MMOL/L (ref 136–145)
WBC # BLD AUTO: 3.2 X10*3/UL (ref 4.4–11.3)

## 2024-02-29 PROCEDURE — 80048 BASIC METABOLIC PNL TOTAL CA: CPT | Performed by: INTERNAL MEDICINE

## 2024-02-29 PROCEDURE — 82374 ASSAY BLOOD CARBON DIOXIDE: CPT | Performed by: INTERNAL MEDICINE

## 2024-02-29 PROCEDURE — 36415 COLL VENOUS BLD VENIPUNCTURE: CPT | Performed by: INTERNAL MEDICINE

## 2024-02-29 PROCEDURE — 2500000002 HC RX 250 W HCPCS SELF ADMINISTERED DRUGS (ALT 637 FOR MEDICARE OP, ALT 636 FOR OP/ED): Performed by: INTERNAL MEDICINE

## 2024-02-29 PROCEDURE — 2500000004 HC RX 250 GENERAL PHARMACY W/ HCPCS (ALT 636 FOR OP/ED): Performed by: INTERNAL MEDICINE

## 2024-02-29 PROCEDURE — 2500000001 HC RX 250 WO HCPCS SELF ADMINISTERED DRUGS (ALT 637 FOR MEDICARE OP): Performed by: INTERNAL MEDICINE

## 2024-02-29 PROCEDURE — 85025 COMPLETE CBC W/AUTO DIFF WBC: CPT | Performed by: INTERNAL MEDICINE

## 2024-02-29 PROCEDURE — G0378 HOSPITAL OBSERVATION PER HR: HCPCS

## 2024-02-29 PROCEDURE — 1210000001 HC SEMI-PRIVATE ROOM DAILY

## 2024-02-29 RX ADMIN — OSELTAMIVIR PHOSPHATE 75 MG: 75 CAPSULE ORAL at 09:58

## 2024-02-29 RX ADMIN — POTASSIUM CHLORIDE 10 MEQ: 750 TABLET, EXTENDED RELEASE ORAL at 09:58

## 2024-02-29 RX ADMIN — OSELTAMIVIR PHOSPHATE 75 MG: 75 CAPSULE ORAL at 20:11

## 2024-02-29 RX ADMIN — ASPIRIN 162 MG: 81 TABLET, CHEWABLE ORAL at 09:58

## 2024-02-29 RX ADMIN — Medication 2000 UNITS: at 09:58

## 2024-02-29 RX ADMIN — GUAIFENESIN AND DEXTROMETHORPHAN 5 ML: 100; 10 SYRUP ORAL at 02:35

## 2024-02-29 RX ADMIN — ENOXAPARIN SODIUM 40 MG: 40 INJECTION SUBCUTANEOUS at 09:58

## 2024-02-29 RX ADMIN — METOPROLOL SUCCINATE 100 MG: 50 TABLET, EXTENDED RELEASE ORAL at 09:58

## 2024-02-29 RX ADMIN — PREDNISONE 5 MG: 5 TABLET ORAL at 09:58

## 2024-02-29 ASSESSMENT — COGNITIVE AND FUNCTIONAL STATUS - GENERAL
MOBILITY SCORE: 21
CLIMB 3 TO 5 STEPS WITH RAILING: A LITTLE
MOVING TO AND FROM BED TO CHAIR: A LITTLE
DAILY ACTIVITIY SCORE: 24
WALKING IN HOSPITAL ROOM: A LITTLE

## 2024-02-29 ASSESSMENT — PAIN - FUNCTIONAL ASSESSMENT: PAIN_FUNCTIONAL_ASSESSMENT: 0-10

## 2024-02-29 ASSESSMENT — PAIN SCALES - GENERAL: PAINLEVEL_OUTOF10: 0 - NO PAIN

## 2024-02-29 NOTE — PROGRESS NOTES
"Nicolas Mcleod is a 84 y.o. female on day 3 of admission presenting with Influenza.    Subjective   Patient complaining of pain along the buttocks shooting down the hip on the right side and down the leg most likely with radiculopathy.  She is feeling much better as far as respiratory is concerned and her oxygen levels are between 92 to 95% on room air.  Still coughing but less phlegm production on Robitussin DM.  Still taking Tamiflu.  Shortness of breath is better.  Denies any chest pains or any palpitation, she is diuresing well and ambulating around the room.  12 point review of systems otherwise negative.       Objective   Chest x-ray shows no active lung disease with chronic lung changes  Blood pressures have been much better today last blood pressure at 140/74 heart rate of 74 and pulse oximeter reading of 94% on room air  Kidney function is normal with a creatinine of 0.58 and normal electrolytes and acid-base balance  White cell count at 4.1 hemoglobin at 12.5 and platelet count of 1 43,000    Physical Exam  Patient is alert oriented not in distress  Head examination was normal  Neck veins are flat without any bruit  Lungs shows a few rhonchi otherwise clear  Heart regular with a faint systolic ejection murmur  Abdomen soft and nontender good bowel sounds  Extremities without edema with good peripheral pulses  No focal neurological deficits    Last Recorded Vitals  Blood pressure 140/74, pulse 74, temperature 36.2 °C (97.2 °F), resp. rate 16, height 1.575 m (5' 2\"), weight 70.3 kg (154 lb 15.7 oz), SpO2 94 %.  Intake/Output last 3 Shifts:  I/O last 3 completed shifts:  In: 280 (4 mL/kg) [P.O.:280]  Out: - (0 mL/kg)   Weight: 70.3 kg     Relevant Results          Current Facility-Administered Medications:     amLODIPine (Norvasc) tablet 5 mg, 5 mg, oral, BID, Pilo Heath MD    aspirin chewable tablet 162 mg, 162 mg, oral, Daily, Pilo Heath MD, 162 mg at 02/28/24 0909    cholecalciferol (Vitamin " D-3) tablet 2,000 Units, 2,000 Units, oral, Once per day on Mon Thu, Sebas Heath MD, 2,000 Units at 02/26/24 0942    dextromethorphan-guaifenesin (Robitussin DM)  mg/5 mL oral liquid 5 mL, 5 mL, oral, q4h PRN, Sebas Heath MD    enoxaparin (Lovenox) syringe 40 mg, 40 mg, subcutaneous, Daily, Sebas Heath MD, 40 mg at 02/28/24 0909    [START ON 3/2/2024] folic acid (Folvite) tablet 1 mg, 1 mg, oral, Weekly, Sebas Heath MD    ibuprofen tablet 800 mg, 800 mg, oral, BID PRN, Sebas Heath MD, 800 mg at 02/27/24 1237    ipratropium-albuteroL (Duo-Neb) 0.5-2.5 mg/3 mL nebulizer solution 3 mL, 3 mL, nebulization, q2h PRN, Sebas Heath MD    [Held by provider] losartan (Cozaar) 100 mg, hydroCHLOROthiazide (HYDRODiuril) 25 mg, , oral, Daily, Sebas Heath MD, New Bag at 02/26/24 0947    methotrexate (Trexall) tablet 12.5 mg, 12.5 mg, oral, Weekly, Christal Winters, [facility], 12.5 mg at 02/27/24 1836    metoprolol succinate XL (Toprol-XL) 24 hr tablet 100 mg, 100 mg, oral, Daily, Sebas Heath MD, 100 mg at 02/28/24 0909    oseltamivir (Tamiflu) capsule 75 mg, 75 mg, oral, BID, Sebas Heath MD, 75 mg at 02/28/24 2132    potassium chloride CR (Klor-Con) ER tablet 10 mEq, 10 mEq, oral, Daily, Sebas Heath MD, 10 mEq at 02/28/24 0909    predniSONE (Deltasone) tablet 5 mg, 5 mg, oral, Daily, Sebas Heath MD, 5 mg at 02/28/24 0909   No results found for this or any previous visit (from the past 24 hour(s)).   XR chest 2 views    Result Date: 2/28/2024  Interpreted By:  Schoenberger, Joseph, STUDY: XR CHEST 2 VIEWS;  2/28/2024 1:50 pm   INDICATION: Signs/Symptoms:follow up for Influenza pneumonia.   COMPARISON: 02/25/2024   ACCESSION NUMBER(S): [id number]   ORDERING CLINICIAN: SEBAS HEATH   FINDINGS:         CARDIOMEDIASTINAL SILHOUETTE: Cardiomediastinal silhouette is normal in size and configuration.   LUNGS: Scattered calcified granulomata in both lungs unchanged from prior. No new focal lung  opacity or airspace infiltrate on today's exam.   ABDOMEN: No remarkable upper abdominal findings.   BONES: No acute osseous changes.       1.  No focal infiltrate. See discussion above       MACRO: None   Signed by: Joseph Schoenberger 2/28/2024 2:19 PM Dictation workstation:   [id number]             Assessment/Plan   Principal Problem:    Influenza    Influenza A with initial shortness of breath with hypoxemia currently much improved with oxygen levels 92 to 95% on room air.  Still coughing with phlegm production but markedly less phlegm production.  Still on Tamiflu and DuoNeb nebulizers as well as supplemental oxygen and prednisone 5 mg daily.  Robitussin DM for cough.  Rheumatoid arthritis on methotrexate, prednisone, Remicade being followed up by Dr. Thornton  Low back pain radiating to the right leg most likely with radiculopathy to be worked up as an outpatient upon discharge  Vitamin D3 deficiency on vitamin D3 2000 units daily  History of hypertension, blood pressures have been fluctuating but mostly in the low side  DVT prophylaxis on Lovenox 40 mg subcu daily       I spent 30 minutes in the professional and overall care of this patient.      Pilo Heath MD FACP

## 2024-03-01 VITALS
WEIGHT: 154.98 LBS | RESPIRATION RATE: 18 BRPM | SYSTOLIC BLOOD PRESSURE: 142 MMHG | DIASTOLIC BLOOD PRESSURE: 74 MMHG | HEIGHT: 62 IN | BODY MASS INDEX: 28.52 KG/M2 | HEART RATE: 68 BPM | TEMPERATURE: 97.7 F | OXYGEN SATURATION: 96 %

## 2024-03-01 PROCEDURE — G0378 HOSPITAL OBSERVATION PER HR: HCPCS

## 2024-03-01 PROCEDURE — 2500000002 HC RX 250 W HCPCS SELF ADMINISTERED DRUGS (ALT 637 FOR MEDICARE OP, ALT 636 FOR OP/ED): Performed by: INTERNAL MEDICINE

## 2024-03-01 PROCEDURE — 2500000004 HC RX 250 GENERAL PHARMACY W/ HCPCS (ALT 636 FOR OP/ED): Performed by: INTERNAL MEDICINE

## 2024-03-01 PROCEDURE — 97530 THERAPEUTIC ACTIVITIES: CPT | Mod: GP | Performed by: PHYSICAL THERAPIST

## 2024-03-01 PROCEDURE — 2500000001 HC RX 250 WO HCPCS SELF ADMINISTERED DRUGS (ALT 637 FOR MEDICARE OP): Performed by: INTERNAL MEDICINE

## 2024-03-01 RX ORDER — IPRATROPIUM BROMIDE AND ALBUTEROL SULFATE 2.5; .5 MG/3ML; MG/3ML
3 SOLUTION RESPIRATORY (INHALATION) EVERY 2 HOUR PRN
Qty: 180 ML | Refills: 11 | Status: SHIPPED | OUTPATIENT
Start: 2024-03-01

## 2024-03-01 RX ORDER — GUAIFENESIN/DEXTROMETHORPHAN 100-10MG/5
5 SYRUP ORAL EVERY 4 HOURS PRN
Qty: 118 ML | Refills: 0 | Status: SHIPPED | OUTPATIENT
Start: 2024-03-01

## 2024-03-01 RX ORDER — AMLODIPINE BESYLATE 5 MG/1
5 TABLET ORAL 2 TIMES DAILY
Qty: 60 TABLET | Refills: 1 | Status: SHIPPED | OUTPATIENT
Start: 2024-03-01

## 2024-03-01 RX ADMIN — METOPROLOL SUCCINATE 100 MG: 50 TABLET, EXTENDED RELEASE ORAL at 10:07

## 2024-03-01 RX ADMIN — GUAIFENESIN AND DEXTROMETHORPHAN 5 ML: 100; 10 SYRUP ORAL at 05:55

## 2024-03-01 RX ADMIN — OSELTAMIVIR PHOSPHATE 75 MG: 75 CAPSULE ORAL at 10:06

## 2024-03-01 RX ADMIN — ASPIRIN 162 MG: 81 TABLET, CHEWABLE ORAL at 10:06

## 2024-03-01 RX ADMIN — PREDNISONE 5 MG: 5 TABLET ORAL at 10:06

## 2024-03-01 RX ADMIN — POTASSIUM CHLORIDE 10 MEQ: 750 TABLET, EXTENDED RELEASE ORAL at 10:06

## 2024-03-01 ASSESSMENT — PAIN - FUNCTIONAL ASSESSMENT: PAIN_FUNCTIONAL_ASSESSMENT: 0-10

## 2024-03-01 ASSESSMENT — COGNITIVE AND FUNCTIONAL STATUS - GENERAL
MOBILITY SCORE: 23
CLIMB 3 TO 5 STEPS WITH RAILING: A LITTLE

## 2024-03-01 ASSESSMENT — PAIN SCALES - GENERAL: PAINLEVEL_OUTOF10: 3

## 2024-03-01 NOTE — DISCHARGE SUMMARY
Discharge Diagnosis  Influenza A  Rheumatoid arthritis  Low back pain with radiculopathy  Vitamin D3 deficiency  Hypertension    Issues Requiring Follow-Up  Patient to follow-up in the office in 1 week to be adjust blood pressure medications    Test Results Pending At Discharge  Pending Labs       No current pending labs.          Current Facility-Administered Medications:     amLODIPine (Norvasc) tablet 5 mg, 5 mg, oral, BID, Pilo Heath MD    aspirin chewable tablet 162 mg, 162 mg, oral, Daily, Pilo Heath MD, 162 mg at 02/29/24 0958    cholecalciferol (Vitamin D-3) tablet 2,000 Units, 2,000 Units, oral, Once per day on Mon Thu, Pilo Heath MD, 2,000 Units at 02/29/24 0958    dextromethorphan-guaifenesin (Robitussin DM)  mg/5 mL oral liquid 5 mL, 5 mL, oral, q4h PRN, Pilo Heath MD, 5 mL at 02/29/24 0235    enoxaparin (Lovenox) syringe 40 mg, 40 mg, subcutaneous, Daily, Pilo Heath MD, 40 mg at 02/29/24 0958    [START ON 3/2/2024] folic acid (Folvite) tablet 1 mg, 1 mg, oral, Weekly, Pilo Heath MD    ibuprofen tablet 800 mg, 800 mg, oral, BID PRN, Pilo Heath MD, 800 mg at 02/27/24 1237    ipratropium-albuteroL (Duo-Neb) 0.5-2.5 mg/3 mL nebulizer solution 3 mL, 3 mL, nebulization, q2h PRN, Pilo Heath MD    [Held by provider] losartan (Cozaar) 100 mg, hydroCHLOROthiazide (HYDRODiuril) 25 mg, , oral, Daily, Pilo Heath MD, New Bag at 02/26/24 0947    methotrexate (Trexall) tablet 12.5 mg, 12.5 mg, oral, Weekly, Christal Winters, [facility], 12.5 mg at 02/27/24 1836    metoprolol succinate XL (Toprol-XL) 24 hr tablet 100 mg, 100 mg, oral, Daily, Pilo Heath MD, 100 mg at 02/29/24 0958    oseltamivir (Tamiflu) capsule 75 mg, 75 mg, oral, BID, Pilo Heath MD, 75 mg at 02/29/24 2011    potassium chloride CR (Klor-Con) ER tablet 10 mEq, 10 mEq, oral, Daily, Pilo Heath MD, 10 mEq at 02/29/24 0958    predniSONE (Deltasone) tablet 5 mg, 5 mg, oral, Daily, Pilo Heath,  MD, 5 mg at 02/29/24 0958   Results for orders placed or performed during the hospital encounter of 02/25/24 (from the past 96 hour(s))   Comprehensive metabolic panel   Result Value Ref Range    Glucose 64 (L) 74 - 99 mg/dL    Sodium 139 136 - 145 mmol/L    Potassium 3.9 3.5 - 5.3 mmol/L    Chloride 100 98 - 107 mmol/L    Bicarbonate 33 (H) 21 - 32 mmol/L    Anion Gap 10 10 - 20 mmol/L    Urea Nitrogen 15 6 - 23 mg/dL    Creatinine 0.58 0.50 - 1.05 mg/dL    eGFR 90 >60 mL/min/1.73m*2    Calcium 8.9 8.6 - 10.3 mg/dL    Albumin 2.9 (L) 3.4 - 5.0 g/dL    Alkaline Phosphatase 41 33 - 136 U/L    Total Protein 6.3 (L) 6.4 - 8.2 g/dL    AST 23 9 - 39 U/L    Bilirubin, Total 0.3 0.0 - 1.2 mg/dL    ALT 8 7 - 45 U/L   CBC and Auto Differential   Result Value Ref Range    WBC 4.1 (L) 4.4 - 11.3 x10*3/uL    nRBC 0.0 0.0 - 0.0 /100 WBCs    RBC 4.09 4.00 - 5.20 x10*6/uL    Hemoglobin 11.5 (L) 12.0 - 16.0 g/dL    Hematocrit 37.0 36.0 - 46.0 %    MCV 91 80 - 100 fL    MCH 28.1 26.0 - 34.0 pg    MCHC 31.1 (L) 32.0 - 36.0 g/dL    RDW 14.6 (H) 11.5 - 14.5 %    Platelets 143 (L) 150 - 450 x10*3/uL    Neutrophils % 24.5 40.0 - 80.0 %    Immature Granulocytes %, Automated 0.0 0.0 - 0.9 %    Lymphocytes % 54.9 13.0 - 44.0 %    Monocytes % 18.9 2.0 - 10.0 %    Eosinophils % 1.2 0.0 - 6.0 %    Basophils % 0.5 0.0 - 2.0 %    Neutrophils Absolute 1.00 (L) 1.60 - 5.50 x10*3/uL    Immature Granulocytes Absolute, Automated 0.00 0.00 - 0.50 x10*3/uL    Lymphocytes Absolute 2.24 0.80 - 3.00 x10*3/uL    Monocytes Absolute 0.77 0.05 - 0.80 x10*3/uL    Eosinophils Absolute 0.05 0.00 - 0.40 x10*3/uL    Basophils Absolute 0.02 0.00 - 0.10 x10*3/uL   Basic metabolic panel   Result Value Ref Range    Glucose 107 (H) 74 - 99 mg/dL    Sodium 139 136 - 145 mmol/L    Potassium 4.1 3.5 - 5.3 mmol/L    Chloride 101 98 - 107 mmol/L    Bicarbonate 33 (H) 21 - 32 mmol/L    Anion Gap 9 (L) 10 - 20 mmol/L    Urea Nitrogen 16 6 - 23 mg/dL    Creatinine 0.62 0.50 -  1.05 mg/dL    eGFR 89 >60 mL/min/1.73m*2    Calcium 9.3 8.6 - 10.3 mg/dL   CBC and Auto Differential   Result Value Ref Range    WBC 3.2 (L) 4.4 - 11.3 x10*3/uL    nRBC 0.0 0.0 - 0.0 /100 WBCs    RBC 4.19 4.00 - 5.20 x10*6/uL    Hemoglobin 11.8 (L) 12.0 - 16.0 g/dL    Hematocrit 37.9 36.0 - 46.0 %    MCV 91 80 - 100 fL    MCH 28.2 26.0 - 34.0 pg    MCHC 31.1 (L) 32.0 - 36.0 g/dL    RDW 14.0 11.5 - 14.5 %    Platelets 161 150 - 450 x10*3/uL    Neutrophils % 47.4 40.0 - 80.0 %    Immature Granulocytes %, Automated 0.3 0.0 - 0.9 %    Lymphocytes % 45.2 13.0 - 44.0 %    Monocytes % 5.9 2.0 - 10.0 %    Eosinophils % 0.9 0.0 - 6.0 %    Basophils % 0.3 0.0 - 2.0 %    Neutrophils Absolute 1.53 (L) 1.60 - 5.50 x10*3/uL    Immature Granulocytes Absolute, Automated 0.01 0.00 - 0.50 x10*3/uL    Lymphocytes Absolute 1.46 0.80 - 3.00 x10*3/uL    Monocytes Absolute 0.19 0.05 - 0.80 x10*3/uL    Eosinophils Absolute 0.03 0.00 - 0.40 x10*3/uL    Basophils Absolute 0.01 0.00 - 0.10 x10*3/uL   SST TOP   Result Value Ref Range    Extra Tube Hold for add-ons.     XR chest 2 views    Result Date: 2/28/2024  Interpreted By:  Schoenberger, Joseph, STUDY: XR CHEST 2 VIEWS;  2/28/2024 1:50 pm   INDICATION: Signs/Symptoms:follow up for Influenza pneumonia.   COMPARISON: 02/25/2024   ACCESSION NUMBER(S): [id number]   ORDERING CLINICIAN: SEBAS CLEMENTS   FINDINGS:         CARDIOMEDIASTINAL SILHOUETTE: Cardiomediastinal silhouette is normal in size and configuration.   LUNGS: Scattered calcified granulomata in both lungs unchanged from prior. No new focal lung opacity or airspace infiltrate on today's exam.   ABDOMEN: No remarkable upper abdominal findings.   BONES: No acute osseous changes.       1.  No focal infiltrate. See discussion above       MACRO: None   Signed by: Joseph Schoenberger 2/28/2024 2:19 PM Dictation workstation:   [id number]    ECG 12 lead    Result Date: 2/25/2024  Sinus tachycardia Left bundle branch block Abnormal ECG  When compared with ECG of 25-FEB-2024 07:34, (unconfirmed) No significant change was found See ED provider note for full interpretation and clinical correlation Confirmed by Brittany Guzman ([id number]) on 2/25/2024 1:37:02 PM    ECG 12 lead    Result Date: 2/25/2024  Sinus tachycardia Left bundle branch block Abnormal ECG When compared with ECG of 17-JUL-2023 09:53, Vent. rate has increased BY  46 BPM Questionable change in QRS axis T wave inversion no longer evident in Inferior leads T wave inversion now evident in Lateral leads See ED provider note for full interpretation and clinical correlation Confirmed by Brittany Guzman ([id number]) on 2/25/2024 1:36:53 PM    XR chest 1 view    Result Date: 2/25/2024  STUDY: Chest Radiograph;  2/25/2024 8:37 AM INDICATION: Chest pain. COMPARISON: CT chest 1/3/2024.  CXR 3/16/2019. ACCESSION NUMBER(S): [id number] ORDERING CLINICIAN: BRITTANY CARBALLO TECHNIQUE:  Frontal chest was obtained at 08:37 hours. FINDINGS: CARDIOMEDIASTINAL SILHOUETTE: Cardiomediastinal silhouette is normal in size and configuration. Aortic atherosclerosis which is mildly tortuous.  LUNGS: Multiple scattered granulomas in the lungs. No evidence for acute consolidations or pleural effusions.  ABDOMEN: No remarkable upper abdominal findings.  BONES: No acute osseous changes. Degenerative changes bilateral shoulders and spine.    No acute disease. Calcified pulmonary granulomas observed throughout. Signed by Mu Valle MD      Hospital Course   Patient was seen in the emergency room with shortness of breath and hypoxemia.  Patient was noted to have influenza A.  Patient was admitted for intensive respiratory therapy, oxygen supplementation, and placed on Tamiflu 75 mg twice daily for 5 days.  While in the hospital patient's blood pressure became unstable usually she runs a high blood pressure but blood pressure became extremely low.  Blood pressure medications were readjusted and modified.  Oxygenation improved.  The  last 48 hours paroxysmal level was 95% 97% on room air.  She developed severe coughing however with phlegm production and placed on Robitussin DM.  She was maintained on DuoNeb.  At the time of discharge the patient was still coughing slightly but the phlegm production is markedly less she has been afebrile and has had no respiratory distress oxygenating well.    Pertinent Physical Exam At Time of Discharge  Physical Exam  Alert oriented not in distress  Head examination normal  Neck veins flat without bruits  Lungs with rare occasional rhonchi and wheezes but with very good expansion and no shortness of breath  Heart was regular without murmur  Abdomen soft and nontender good bowel sounds  Extremities no edema    Outpatient Follow-Up  Future Appointments   Date Time Provider Department Center   4/2/2024  8:00 AM INF 01 [facility] [place]   8/21/2024  9:15 AM Pranay Salomon MD [id number] [place]       Pilo Heath MD FACP

## 2024-03-01 NOTE — PROGRESS NOTES
Physical Therapy    Physical Therapy Treatment    Patient Name: Nicolas Mcleod  MRN: [id number]  Today's Date: 3/1/2024  Time Calculation  Start Time: 1110  Stop Time: 1124  Time Calculation (min): 14 min       Assessment/Plan   PT Assessment  PT Assessment Results: Decreased mobility, Decreased strength, Pain  Rehab Prognosis: Excellent  Evaluation/Treatment Tolerance: Patient tolerated treatment well  End of Session Communication: Bedside nurse  Assessment Comment: Pt. cleared for safe ambulation and stair negotiation and is safe to return home with assist from supportive family and no functional rehab needs. Educated on use of cane with R hip pain/fatigue to reduce risk for falls. Pt. verbalized understanding.  End of Session Patient Position: Bed, 2 rail up, Alarm off, not on at start of session  PT Plan  Inpatient/Swing Bed or Outpatient: Inpatient  PT Plan  Treatment/Interventions: Transfer training, Gait training, Endurance training  PT Plan: Skilled PT  PT Frequency: 2 times per week  PT Discharge Recommendations: No PT needed after discharge  PT Recommended Transfer Status: Independent  PT - OK to Discharge: Yes (PT evaluation complete and rehab recommendations made.)        General Visit Information:   PT  Visit  PT Received On: 03/01/24  Response to Previous Treatment: Patient with no complaints from previous session.  General  Reason for Referral: Impaired mobility  Past Medical History Relevant to Rehab: HLD, HTN, Arthritis, CAD, Neck surgery, hyster, TKA, RA, hearing oss, obesity, oseoporosis  Family/Caregiver Present: No  Prior to Session Communication: Bedside nurse  Patient Position Received: Bed, 2 rail up, Alarm off, not on at start of session  General Comment: Pt. getting dressed and prepared for DC today. Pleasant and agreeable to participate in session. Continues to c/o R hip pain.  Subjective     Precautions:  Precautions  Medical Precautions:  (Droplet)    Vital Signs:  Vital Signs  Heart  Rate: 68  SpO2: 96 %  Objective     Pain:  Pain Assessment  Pain Assessment: 0-10  Pain Score: 3  Pain Type: Chronic pain  Pain Location: Hip  Pain Orientation: Right    Cognition:  Cognition  Overall Cognitive Status: Within Functional Limits  Orientation Level: Oriented X4    Postural Control:  Postural Control  Postural Control: Within Functional Limits                Activity Tolerance:  Activity Tolerance  Endurance: Endurance does not limit participation in activity    Treatments:     Therapeutic Activity  Therapeutic Activity Performed: Yes  Therapeutic Activity 1: transfers, gait, stair negotation     Bed Mobility  Bed Mobility: No (pt. sitting EOB pre/post session)  Ambulation/Gait Training 1  Surface 1: Level tile  Device 1: No device  Assistance 1: Close supervision  Quality of Gait 1:  (Slightly decreased regine and decreased stance on painful RLE. No subjective buckling or LOB noted. No VALIENTE or report of dizziness. VSS throughout.)  Comments/Distance (ft) 1: 65 ft x 2  Transfers  Transfer: Yes  Transfer 1  Technique 1: Sit to stand, Stand to sit  Transfer Device 1:  (no AD)  Transfer Level of Assistance 1: Modified independent  Stairs  Stairs: Yes  Stairs  Rails 1: Left  Device 1: Railing  Assistance 1: Contact guard  Comment/Number of Steps 1: 4 (ascend/descend with step-to pattern. Educated pt. on proper sequencing to offload painful RLE. Demonstrated and verbalized understanding)       Outcome Measures:  [facility] Basic Mobility  Turning from your back to your side while in a flat bed without using bedrails: None  Moving from lying on your back to sitting on the side of a flat bed without using bedrails: None  Moving to and from bed to chair (including a wheelchair): None  Standing up from a chair using your arms (e.g. wheelchair or bedside chair): None  To walk in hospital room: None  Climbing 3-5 steps with railing: A little  Basic Mobility - Total Score: 23  Education Documentation  Mobility Training,  taught by Carol March, PT at 3/1/2024 11:44 AM.  Learner: Patient  Readiness: Acceptance  Method: Explanation  Response: Verbalizes Understanding          EDUCATION:     Encounter Problems       Encounter Problems (Active)       PT Problem       Transfers sit <> stand independent  (Progressing)       Start:  02/26/24    Expected End:  03/25/24            Patient to ambulate 3 bouts of 30' independently  (Progressing)       Start:  02/26/24    Expected End:  03/25/24               Pain - Adult

## 2024-03-01 NOTE — PROGRESS NOTES
"Nicolas Mcleod is a 84 y.o. female on day 4 of admission presenting with Influenza.    Subjective   Patient still with coughing but less phlegm production.  Denies any shortness of breath and oxygen saturation on room air is 95 to 97%.  Denies any chest pains or any palpitation.  Denies any headaches dizziness.  Ambulating around the room  Bowels are moving well and no dysuria       Objective   Last blood pressure at 128/65 heart rate of 62 pulse oximeter reading of 95% on room air  Electrolytes and acid-base balance are normal with a creatinine normal at 0.62  White cell count is normal at 3.2 hemoglobin 11.8 platelet is 161  Vital signs the whole day or normal with good saturation above 95% on room air    Physical Exam  Alert oriented not in distress stable vital signs  Head examination normal pupils equal sclera nonicteric no facial asymmetry  Neck veins are flat without bruits  Lungs with occasional rhonchi otherwise clear with good expansion of the lung  Heart is regular there was no murmur  Abdomen soft and nontender good bowel sounds  Extremities no edema    Last Recorded Vitals  Blood pressure 128/65, pulse 62, temperature 36.5 °C (97.7 °F), resp. rate 18, height 1.575 m (5' 2\"), weight 70.3 kg (154 lb 15.7 oz), SpO2 95 %.  Intake/Output last 3 Shifts:  No intake/output data recorded.    Relevant Results          Current Facility-Administered Medications:     amLODIPine (Norvasc) tablet 5 mg, 5 mg, oral, BID, Pilo Heath MD    aspirin chewable tablet 162 mg, 162 mg, oral, Daily, Pilo Heath MD, 162 mg at 02/29/24 0958    cholecalciferol (Vitamin D-3) tablet 2,000 Units, 2,000 Units, oral, Once per day on Mon Thu, Pilo Heath MD, 2,000 Units at 02/29/24 0958    dextromethorphan-guaifenesin (Robitussin DM)  mg/5 mL oral liquid 5 mL, 5 mL, oral, q4h PRN, Pilo Heath MD, 5 mL at 02/29/24 0235    enoxaparin (Lovenox) syringe 40 mg, 40 mg, subcutaneous, Daily, Pilo Heath MD, 40 mg at " 02/29/24 0958    [START ON 3/2/2024] folic acid (Folvite) tablet 1 mg, 1 mg, oral, Weekly, Pilo Heath MD    ibuprofen tablet 800 mg, 800 mg, oral, BID PRN, Pilo Heath MD, 800 mg at 02/27/24 1237    ipratropium-albuteroL (Duo-Neb) 0.5-2.5 mg/3 mL nebulizer solution 3 mL, 3 mL, nebulization, q2h PRN, Pilo Heath MD    [Held by provider] losartan (Cozaar) 100 mg, hydroCHLOROthiazide (HYDRODiuril) 25 mg, , oral, Daily, Pilo Heath MD, New Bag at 02/26/24 0947    methotrexate (Trexall) tablet 12.5 mg, 12.5 mg, oral, Weekly, Christal Winters, [facility], 12.5 mg at 02/27/24 1836    metoprolol succinate XL (Toprol-XL) 24 hr tablet 100 mg, 100 mg, oral, Daily, Pilo Heath MD, 100 mg at 02/29/24 0958    oseltamivir (Tamiflu) capsule 75 mg, 75 mg, oral, BID, Pilo Heath MD, 75 mg at 02/29/24 2011    potassium chloride CR (Klor-Con) ER tablet 10 mEq, 10 mEq, oral, Daily, Pilo Heath MD, 10 mEq at 02/29/24 0958    predniSONE (Deltasone) tablet 5 mg, 5 mg, oral, Daily, Pilo Heath MD, 5 mg at 02/29/24 0958   Results for orders placed or performed during the hospital encounter of 02/25/24 (from the past 24 hour(s))   Basic metabolic panel   Result Value Ref Range    Glucose 107 (H) 74 - 99 mg/dL    Sodium 139 136 - 145 mmol/L    Potassium 4.1 3.5 - 5.3 mmol/L    Chloride 101 98 - 107 mmol/L    Bicarbonate 33 (H) 21 - 32 mmol/L    Anion Gap 9 (L) 10 - 20 mmol/L    Urea Nitrogen 16 6 - 23 mg/dL    Creatinine 0.62 0.50 - 1.05 mg/dL    eGFR 89 >60 mL/min/1.73m*2    Calcium 9.3 8.6 - 10.3 mg/dL   CBC and Auto Differential   Result Value Ref Range    WBC 3.2 (L) 4.4 - 11.3 x10*3/uL    nRBC 0.0 0.0 - 0.0 /100 WBCs    RBC 4.19 4.00 - 5.20 x10*6/uL    Hemoglobin 11.8 (L) 12.0 - 16.0 g/dL    Hematocrit 37.9 36.0 - 46.0 %    MCV 91 80 - 100 fL    MCH 28.2 26.0 - 34.0 pg    MCHC 31.1 (L) 32.0 - 36.0 g/dL    RDW 14.0 11.5 - 14.5 %    Platelets 161 150 - 450 x10*3/uL    Neutrophils % 47.4 40.0 - 80.0 %    Immature  Granulocytes %, Automated 0.3 0.0 - 0.9 %    Lymphocytes % 45.2 13.0 - 44.0 %    Monocytes % 5.9 2.0 - 10.0 %    Eosinophils % 0.9 0.0 - 6.0 %    Basophils % 0.3 0.0 - 2.0 %    Neutrophils Absolute 1.53 (L) 1.60 - 5.50 x10*3/uL    Immature Granulocytes Absolute, Automated 0.01 0.00 - 0.50 x10*3/uL    Lymphocytes Absolute 1.46 0.80 - 3.00 x10*3/uL    Monocytes Absolute 0.19 0.05 - 0.80 x10*3/uL    Eosinophils Absolute 0.03 0.00 - 0.40 x10*3/uL    Basophils Absolute 0.01 0.00 - 0.10 x10*3/uL   SST TOP   Result Value Ref Range    Extra Tube Hold for add-ons.     XR chest 2 views    Result Date: 2/28/2024  Interpreted By:  Schoenberger, Joseph, STUDY: XR CHEST 2 VIEWS;  2/28/2024 1:50 pm   INDICATION: Signs/Symptoms:follow up for Influenza pneumonia.   COMPARISON: 02/25/2024   ACCESSION NUMBER(S): [id number]   ORDERING CLINICIAN: SEBAS CLEMENTS   FINDINGS:         CARDIOMEDIASTINAL SILHOUETTE: Cardiomediastinal silhouette is normal in size and configuration.   LUNGS: Scattered calcified granulomata in both lungs unchanged from prior. No new focal lung opacity or airspace infiltrate on today's exam.   ABDOMEN: No remarkable upper abdominal findings.   BONES: No acute osseous changes.       1.  No focal infiltrate. See discussion above       MACRO: None   Signed by: Joseph Schoenberger 2/28/2024 2:19 PM Dictation workstation:   [id number]             Assessment/Plan   Principal Problem:    Influenza    Influenza A with shortness of breath and hypoxemia resolved.  Patient oxygenating well.  Patient finished using Tamiflu.  Still with cough and phlegm production but markedly less and no shortness of breath.  Ready for discharge  Rheumatoid arthritis on methotrexate prednisone and Remicade, will follow-up with Dr. Thornton  Low back pain radiating to the leg most likely radiculopathy from back issues, will workup and resolved outpatient  Vitamin D3 deficiency on vitamin D3 2000 units daily  Hypertension is controlled blood  pressure moderate medications modified  Plan discharge tomorrow       I spent 30 minutes in the professional and overall care of this patient.      Pilo Heath MD FACP

## 2024-03-01 NOTE — CARE PLAN
The patient's goals for the shift include      The clinical goals for the shift include Pt will maintain O2 >95% throughout this shift

## 2024-03-01 NOTE — CARE PLAN
Problem: Pain  Goal: My pain/discomfort is manageable  Outcome: Progressing     Problem: Safety  Goal: Patient will be injury free during hospitalization  Outcome: Progressing  Goal: I will remain free of falls  Outcome: Progressing     Problem: Daily Care  Goal: Daily care needs are met  Outcome: Progressing     Problem: Psychosocial Needs  Goal: Demonstrates ability to cope with hospitalization/illness  Outcome: Progressing  Goal: Collaborate with me, my family, and caregiver to identify my specific goals  Outcome: Progressing     Problem: Discharge Barriers  Goal: My discharge needs are met  Outcome: Progressing     Problem: Fall/Injury  Goal: Not fall by end of shift  Outcome: Progressing  Goal: Be free from injury by end of the shift  Outcome: Progressing  Goal: Verbalize understanding of personal risk factors for fall in the hospital  Outcome: Progressing  Goal: Verbalize understanding of risk factor reduction measures to prevent injury from fall in the home  Outcome: Progressing  Goal: Use assistive devices by end of the shift  Outcome: Progressing  Goal: Pace activities to prevent fatigue by end of the shift  Outcome: Progressing     Problem: Respiratory  Goal: Clear secretions with interventions this shift  Outcome: Progressing  Goal: Minimize anxiety/maximize coping throughout shift  Outcome: Progressing  Goal: Minimal/no exertional discomfort or dyspnea this shift  Outcome: Progressing  Goal: No signs of respiratory distress (eg. Use of accessory muscles. Peds grunting)  Outcome: Progressing  Goal: Patent airway maintained this shift  Outcome: Progressing  Goal: Verbalize decreased shortness of breath this shift  Outcome: Progressing  Goal: Wean oxygen to maintain O2 saturation per order/standard this shift  Outcome: Progressing  Goal: Increase self care and/or family involvement in next 24 hours  Outcome: Progressing     Problem: Pain - Adult  Goal: Verbalizes/displays adequate comfort level or  baseline comfort level  Outcome: Progressing     Problem: Safety - Adult  Goal: Free from fall injury  Outcome: Progressing     Problem: Discharge Planning  Goal: Discharge to home or other facility with appropriate resources  Outcome: Progressing     Problem: Chronic Conditions and Co-morbidities  Goal: Patient's chronic conditions and co-morbidity symptoms are monitored and maintained or improved  Outcome: Progressing     Problem: Pain  Goal: Takes deep breaths with improved pain control throughout the shift  Outcome: Progressing  Goal: Turns in bed with improved pain control throughout the shift  Outcome: Progressing  Goal: Walks with improved pain control throughout the shift  Outcome: Progressing  Goal: Performs ADL's with improved pain control throughout shift  Outcome: Progressing  Goal: Participates in PT with improved pain control throughout the shift  Outcome: Progressing  Goal: Free from opioid side effects throughout the shift  Outcome: Progressing  Goal: Free from acute confusion related to pain meds throughout the shift  Outcome: Progressing   The patient's goals for the shift include      The clinical goals for the shift include Pt will maintain O2 >95% throughout this shift    Over the shift, the patient did not make progress toward the following goals. Barriers to progression include . Recommendations to address these barriers include .

## 2024-04-02 ENCOUNTER — INFUSION (OUTPATIENT)
Dept: HEMATOLOGY/ONCOLOGY | Facility: CLINIC | Age: 84
End: 2024-04-02
Payer: MEDICARE

## 2024-04-02 ENCOUNTER — LAB REQUISITION (OUTPATIENT)
Dept: LAB | Facility: HOSPITAL | Age: 84
End: 2024-04-02
Payer: MEDICARE

## 2024-04-02 VITALS
HEART RATE: 66 BPM | RESPIRATION RATE: 20 BRPM | BODY MASS INDEX: 28.59 KG/M2 | OXYGEN SATURATION: 97 % | TEMPERATURE: 98.2 F | DIASTOLIC BLOOD PRESSURE: 77 MMHG | WEIGHT: 156.31 LBS | SYSTOLIC BLOOD PRESSURE: 124 MMHG

## 2024-04-02 DIAGNOSIS — E78.49 OTHER HYPERLIPIDEMIA: ICD-10-CM

## 2024-04-02 DIAGNOSIS — I12.9 HYPERTENSIVE CHRONIC KIDNEY DISEASE WITH STAGE 1 THROUGH STAGE 4 CHRONIC KIDNEY DISEASE, OR UNSPECIFIED CHRONIC KIDNEY DISEASE: ICD-10-CM

## 2024-04-02 DIAGNOSIS — M06.9 RHEUMATOID ARTHRITIS, INVOLVING UNSPECIFIED SITE, UNSPECIFIED WHETHER RHEUMATOID FACTOR PRESENT (MULTI): ICD-10-CM

## 2024-04-02 DIAGNOSIS — N18.2 CHRONIC KIDNEY DISEASE, STAGE 2 (MILD): ICD-10-CM

## 2024-04-02 LAB
ALBUMIN SERPL BCP-MCNC: 3.7 G/DL (ref 3.4–5)
ALP SERPL-CCNC: 52 U/L (ref 33–136)
ALT SERPL W P-5'-P-CCNC: 10 U/L (ref 7–45)
ANION GAP SERPL CALC-SCNC: 9 MMOL/L (ref 10–20)
AST SERPL W P-5'-P-CCNC: 14 U/L (ref 9–39)
BASOPHILS # BLD AUTO: 0.02 X10*3/UL (ref 0–0.1)
BASOPHILS NFR BLD AUTO: 0.2 %
BILIRUB SERPL-MCNC: 0.4 MG/DL (ref 0–1.2)
BUN SERPL-MCNC: 16 MG/DL (ref 6–23)
CALCIUM SERPL-MCNC: 9.3 MG/DL (ref 8.6–10.3)
CHLORIDE SERPL-SCNC: 106 MMOL/L (ref 98–107)
CHOLEST SERPL-MCNC: 182 MG/DL (ref 0–199)
CHOLESTEROL/HDL RATIO: 2.8
CO2 SERPL-SCNC: 28 MMOL/L (ref 21–32)
CREAT SERPL-MCNC: 0.58 MG/DL (ref 0.5–1.05)
EGFRCR SERPLBLD CKD-EPI 2021: 90 ML/MIN/1.73M*2
EOSINOPHIL # BLD AUTO: 0.18 X10*3/UL (ref 0–0.4)
EOSINOPHIL NFR BLD AUTO: 2.1 %
ERYTHROCYTE [DISTWIDTH] IN BLOOD BY AUTOMATED COUNT: 15.3 % (ref 11.5–14.5)
GLUCOSE SERPL-MCNC: 79 MG/DL (ref 74–99)
HCT VFR BLD AUTO: 36.9 % (ref 36–46)
HDLC SERPL-MCNC: 64.8 MG/DL
HGB BLD-MCNC: 11.6 G/DL (ref 12–16)
IMM GRANULOCYTES # BLD AUTO: 0.02 X10*3/UL (ref 0–0.5)
IMM GRANULOCYTES NFR BLD AUTO: 0.2 % (ref 0–0.9)
LDLC SERPL CALC-MCNC: 101 MG/DL
LYMPHOCYTES # BLD AUTO: 2.42 X10*3/UL (ref 0.8–3)
LYMPHOCYTES NFR BLD AUTO: 28.4 %
MCH RBC QN AUTO: 27.8 PG (ref 26–34)
MCHC RBC AUTO-ENTMCNC: 31.4 G/DL (ref 32–36)
MCV RBC AUTO: 88 FL (ref 80–100)
MONOCYTES # BLD AUTO: 0.85 X10*3/UL (ref 0.05–0.8)
MONOCYTES NFR BLD AUTO: 10 %
NEUTROPHILS # BLD AUTO: 5.03 X10*3/UL (ref 1.6–5.5)
NEUTROPHILS NFR BLD AUTO: 59.1 %
NON HDL CHOLESTEROL: 117 MG/DL (ref 0–149)
NRBC BLD-RTO: 0 /100 WBCS (ref 0–0)
PLATELET # BLD AUTO: 255 X10*3/UL (ref 150–450)
POTASSIUM SERPL-SCNC: 3.9 MMOL/L (ref 3.5–5.3)
PROT SERPL-MCNC: 7.2 G/DL (ref 6.4–8.2)
RBC # BLD AUTO: 4.18 X10*6/UL (ref 4–5.2)
SODIUM SERPL-SCNC: 139 MMOL/L (ref 136–145)
TRIGL SERPL-MCNC: 80 MG/DL (ref 0–149)
VLDL: 16 MG/DL (ref 0–40)
WBC # BLD AUTO: 8.5 X10*3/UL (ref 4.4–11.3)

## 2024-04-02 PROCEDURE — 2500000004 HC RX 250 GENERAL PHARMACY W/ HCPCS (ALT 636 FOR OP/ED): Mod: JZ | Performed by: INTERNAL MEDICINE

## 2024-04-02 PROCEDURE — 96365 THER/PROPH/DIAG IV INF INIT: CPT | Mod: INF

## 2024-04-02 PROCEDURE — 80061 LIPID PANEL: CPT

## 2024-04-02 PROCEDURE — 80053 COMPREHEN METABOLIC PANEL: CPT

## 2024-04-02 PROCEDURE — 96366 THER/PROPH/DIAG IV INF ADDON: CPT | Mod: INF

## 2024-04-02 PROCEDURE — 85025 COMPLETE CBC W/AUTO DIFF WBC: CPT

## 2024-04-02 RX ORDER — DIPHENHYDRAMINE HYDROCHLORIDE 50 MG/ML
50 INJECTION INTRAMUSCULAR; INTRAVENOUS AS NEEDED
Status: CANCELLED | OUTPATIENT
Start: 2024-05-27

## 2024-04-02 RX ORDER — FAMOTIDINE 10 MG/ML
20 INJECTION INTRAVENOUS ONCE AS NEEDED
Status: CANCELLED | OUTPATIENT
Start: 2024-05-27

## 2024-04-02 RX ORDER — EPINEPHRINE 0.3 MG/.3ML
0.3 INJECTION SUBCUTANEOUS EVERY 5 MIN PRN
Status: CANCELLED | OUTPATIENT
Start: 2024-05-27

## 2024-04-02 RX ORDER — ALBUTEROL SULFATE 0.83 MG/ML
3 SOLUTION RESPIRATORY (INHALATION) AS NEEDED
Status: CANCELLED | OUTPATIENT
Start: 2024-05-27

## 2024-04-02 RX ADMIN — SODIUM CHLORIDE 400 MG: 9 INJECTION, SOLUTION INTRAVENOUS at 09:24

## 2024-04-02 ASSESSMENT — PAIN SCALES - GENERAL: PAINLEVEL: 0-NO PAIN

## 2024-04-02 NOTE — PROGRESS NOTES
Patient denies any recent infection or antibiotic use.  Patient denies any recent vaccinations.  Patient received Infliximab infusion without sign of adverse reaction.  To return in 8 weeks.  Discharged in stable condition.

## 2024-05-06 NOTE — OP NOTE
SURGEON:  Roland James Reyes, MD    PREOPERATIVE DIAGNOSIS:    Atypical pigmented lesion of left dorsal foot.    POSTOPERATIVE DIAGNOSIS:    Atypical pigmented lesion of left dorsal foot.    PROCEDURE:    Excision of atypical pigmented lesion of left dorsal foot.    ASSISTANT:    Fito Cordero.    ANESTHESIA:    MAC.    OPERATIVE INDICATION:    The patient is an 83-year-old  female who previously  sustained a laceration to the left dorsal foot and subsequently  developed a pigmented lesion in the region of the scar that now has  variegation in color.  The patient is now to undergo excision to rule  out skin cancer.     OPERATIVE PROCEDURE:    The patient was taken to the operating suite and placed in supine  position.  After successful sedation by the anesthesia personnel, the  site was then locally infiltrated with 2% lidocaine with 1:100,000  epinephrine.  Next, 1 cm lesion was then excised with a 2.5 cm  incision.  The incision was made in a lenticular shaped fashion down  to subcutaneous tissue.  The specimen was then sent to Pathology.  Hemostasis was then obtained using electrocautery.  Closure was then  done using interrupted 3-0 nylon sutures in simple interrupted  fashion followed by sterile dressing, comprised of Aquacel Silver and  thin DuoDerm.  The patient tolerated the procedure well, was sent to  [facility] in stable condition.  All instrument and sponge counts were  correct.  Estimated blood loss was minimal.       Roland James Reyes, MD    DD:  04/25/2023 18:45:15 EST  DT:  04/26/2023 04:39:59 EST  DICTATION NUMBER:  [id number]  INTERNAL JOB NUMBER:  [id number]      CC:ï¿½             [facility]          Electronic Signatures:  Reyes, Roland (MD) (Signed on 26-Apr-2023 04:58)   Authored  Unsigned, Draft (SYS GENERATED) (Entered on 26-Apr-2023 04:39)   Entered    Last Updated: 26-Apr-2023 04:58 by Reyes, Roland (MD)

## 2024-05-23 ENCOUNTER — LAB (OUTPATIENT)
Dept: LAB | Facility: LAB | Age: 84
End: 2024-05-23
Payer: MEDICARE

## 2024-05-23 DIAGNOSIS — M05.79 RHEUMATOID ARTHRITIS WITH RHEUMATOID FACTOR OF MULTIPLE SITES WITHOUT ORGAN OR SYSTEMS INVOLVEMENT (MULTI): Primary | ICD-10-CM

## 2024-05-23 LAB — HBV SURFACE AG SERPL QL IA: NONREACTIVE

## 2024-05-23 PROCEDURE — 87340 HEPATITIS B SURFACE AG IA: CPT

## 2024-05-23 PROCEDURE — 36415 COLL VENOUS BLD VENIPUNCTURE: CPT

## 2024-05-23 PROCEDURE — 86481 TB AG RESPONSE T-CELL SUSP: CPT

## 2024-05-25 LAB
NIL(NEG) CONTROL SPOT COUNT: NORMAL
PANEL A SPOT COUNT: 0
PANEL B SPOT COUNT: 0
POS CONTROL SPOT COUNT: NORMAL
T-SPOT. TB INTERPRETATION: NEGATIVE

## 2024-05-28 ENCOUNTER — INFUSION (OUTPATIENT)
Dept: HEMATOLOGY/ONCOLOGY | Facility: CLINIC | Age: 84
End: 2024-05-28
Payer: MEDICARE

## 2024-05-28 VITALS
BODY MASS INDEX: 29.76 KG/M2 | WEIGHT: 157.63 LBS | DIASTOLIC BLOOD PRESSURE: 68 MMHG | HEART RATE: 61 BPM | SYSTOLIC BLOOD PRESSURE: 115 MMHG | HEIGHT: 61 IN | RESPIRATION RATE: 20 BRPM | TEMPERATURE: 96.8 F | OXYGEN SATURATION: 97 %

## 2024-05-28 DIAGNOSIS — M06.9 RHEUMATOID ARTHRITIS, INVOLVING UNSPECIFIED SITE, UNSPECIFIED WHETHER RHEUMATOID FACTOR PRESENT (MULTI): ICD-10-CM

## 2024-05-28 PROCEDURE — 96415 CHEMO IV INFUSION ADDL HR: CPT

## 2024-05-28 PROCEDURE — 2500000004 HC RX 250 GENERAL PHARMACY W/ HCPCS (ALT 636 FOR OP/ED): Performed by: INTERNAL MEDICINE

## 2024-05-28 PROCEDURE — 96413 CHEMO IV INFUSION 1 HR: CPT

## 2024-05-28 RX ORDER — DIPHENHYDRAMINE HYDROCHLORIDE 50 MG/ML
50 INJECTION INTRAMUSCULAR; INTRAVENOUS AS NEEDED
OUTPATIENT
Start: 2024-07-23

## 2024-05-28 RX ORDER — FAMOTIDINE 10 MG/ML
20 INJECTION INTRAVENOUS ONCE AS NEEDED
OUTPATIENT
Start: 2024-07-23

## 2024-05-28 RX ORDER — ALBUTEROL SULFATE 0.83 MG/ML
3 SOLUTION RESPIRATORY (INHALATION) AS NEEDED
OUTPATIENT
Start: 2024-07-23

## 2024-05-28 RX ORDER — EPINEPHRINE 0.3 MG/.3ML
0.3 INJECTION SUBCUTANEOUS EVERY 5 MIN PRN
OUTPATIENT
Start: 2024-07-23

## 2024-05-28 RX ADMIN — SODIUM CHLORIDE 400 MG: 9 INJECTION, SOLUTION INTRAVENOUS at 09:00

## 2024-05-28 ASSESSMENT — PAIN SCALES - GENERAL: PAINLEVEL: 0-NO PAIN

## 2024-05-28 NOTE — PROGRESS NOTES
0800:  Patient denies any recent infection or antibiotic use.  Patient denies any recent vaccinations.  1115:  Patient received Infliximab 5 mg/kg infusion without sign of adverse reaction.  To return in 8 weeks.  Discharged in stable condition.

## 2024-05-29 PROCEDURE — RXMED WILLOW AMBULATORY MEDICATION CHARGE

## 2024-06-05 ENCOUNTER — PHARMACY VISIT (OUTPATIENT)
Dept: PHARMACY | Facility: CLINIC | Age: 84
End: 2024-06-05
Payer: COMMERCIAL

## 2024-07-23 ENCOUNTER — APPOINTMENT (OUTPATIENT)
Dept: HEMATOLOGY/ONCOLOGY | Facility: CLINIC | Age: 84
End: 2024-07-23
Payer: MEDICARE

## 2024-08-06 ENCOUNTER — LAB (OUTPATIENT)
Dept: LAB | Facility: LAB | Age: 84
End: 2024-08-06
Payer: MEDICARE

## 2024-08-06 DIAGNOSIS — R79.9 ABNORMAL FINDING OF BLOOD CHEMISTRY, UNSPECIFIED: ICD-10-CM

## 2024-08-06 DIAGNOSIS — I12.9 HYPERTENSIVE CHRONIC KIDNEY DISEASE WITH STAGE 1 THROUGH STAGE 4 CHRONIC KIDNEY DISEASE, OR UNSPECIFIED CHRONIC KIDNEY DISEASE: ICD-10-CM

## 2024-08-06 DIAGNOSIS — Z13.89 ENCOUNTER FOR SCREENING FOR OTHER DISORDER: ICD-10-CM

## 2024-08-06 DIAGNOSIS — N18.2 CHRONIC KIDNEY DISEASE, STAGE 2 (MILD): Primary | ICD-10-CM

## 2024-08-06 DIAGNOSIS — M05.79 RHEUMATOID ARTHRITIS WITH RHEUMATOID FACTOR OF MULTIPLE SITES WITHOUT ORGAN OR SYSTEMS INVOLVEMENT (MULTI): ICD-10-CM

## 2024-08-06 DIAGNOSIS — R82.998 OTHER ABNORMAL FINDINGS IN URINE: ICD-10-CM

## 2024-08-06 DIAGNOSIS — E78.49 OTHER HYPERLIPIDEMIA: ICD-10-CM

## 2024-08-06 LAB
ALBUMIN SERPL BCP-MCNC: 3.7 G/DL (ref 3.4–5)
ALP SERPL-CCNC: 47 U/L (ref 33–136)
ALT SERPL W P-5'-P-CCNC: 9 U/L (ref 7–45)
ANION GAP SERPL CALC-SCNC: 9 MMOL/L (ref 10–20)
AST SERPL W P-5'-P-CCNC: 15 U/L (ref 9–39)
BASOPHILS # BLD AUTO: 0.03 X10*3/UL (ref 0–0.1)
BASOPHILS NFR BLD AUTO: 0.5 %
BILIRUB DIRECT SERPL-MCNC: 0.1 MG/DL (ref 0–0.3)
BILIRUB SERPL-MCNC: 0.5 MG/DL (ref 0–1.2)
BUN SERPL-MCNC: 20 MG/DL (ref 6–23)
CALCIUM SERPL-MCNC: 9.8 MG/DL (ref 8.6–10.3)
CHLORIDE SERPL-SCNC: 104 MMOL/L (ref 98–107)
CHOLEST SERPL-MCNC: 194 MG/DL (ref 0–199)
CHOLESTEROL/HDL RATIO: 2.8
CO2 SERPL-SCNC: 31 MMOL/L (ref 21–32)
CREAT SERPL-MCNC: 0.7 MG/DL (ref 0.5–1.05)
EGFRCR SERPLBLD CKD-EPI 2021: 86 ML/MIN/1.73M*2
EOSINOPHIL # BLD AUTO: 0.18 X10*3/UL (ref 0–0.4)
EOSINOPHIL NFR BLD AUTO: 3 %
ERYTHROCYTE [DISTWIDTH] IN BLOOD BY AUTOMATED COUNT: 15.1 % (ref 11.5–14.5)
GLUCOSE SERPL-MCNC: 76 MG/DL (ref 74–99)
HCT VFR BLD AUTO: 42 % (ref 36–46)
HDLC SERPL-MCNC: 68.9 MG/DL
HGB BLD-MCNC: 13 G/DL (ref 12–16)
IMM GRANULOCYTES # BLD AUTO: 0.01 X10*3/UL (ref 0–0.5)
IMM GRANULOCYTES NFR BLD AUTO: 0.2 % (ref 0–0.9)
LDLC SERPL CALC-MCNC: 108 MG/DL
LYMPHOCYTES # BLD AUTO: 2.37 X10*3/UL (ref 0.8–3)
LYMPHOCYTES NFR BLD AUTO: 39.8 %
MCH RBC QN AUTO: 28 PG (ref 26–34)
MCHC RBC AUTO-ENTMCNC: 31 G/DL (ref 32–36)
MCV RBC AUTO: 91 FL (ref 80–100)
MONOCYTES # BLD AUTO: 0.54 X10*3/UL (ref 0.05–0.8)
MONOCYTES NFR BLD AUTO: 9.1 %
NEUTROPHILS # BLD AUTO: 2.83 X10*3/UL (ref 1.6–5.5)
NEUTROPHILS NFR BLD AUTO: 47.4 %
NON HDL CHOLESTEROL: 125 MG/DL (ref 0–149)
NRBC BLD-RTO: 0 /100 WBCS (ref 0–0)
PLATELET # BLD AUTO: 174 X10*3/UL (ref 150–450)
POTASSIUM SERPL-SCNC: 3.8 MMOL/L (ref 3.5–5.3)
PROT SERPL-MCNC: 7.6 G/DL (ref 6.4–8.2)
RBC # BLD AUTO: 4.64 X10*6/UL (ref 4–5.2)
SODIUM SERPL-SCNC: 140 MMOL/L (ref 136–145)
TRIGL SERPL-MCNC: 87 MG/DL (ref 0–149)
VLDL: 17 MG/DL (ref 0–40)
WBC # BLD AUTO: 6 X10*3/UL (ref 4.4–11.3)

## 2024-08-06 PROCEDURE — 80061 LIPID PANEL: CPT

## 2024-08-06 PROCEDURE — 82248 BILIRUBIN DIRECT: CPT

## 2024-08-06 PROCEDURE — 85025 COMPLETE CBC W/AUTO DIFF WBC: CPT

## 2024-08-06 PROCEDURE — 36415 COLL VENOUS BLD VENIPUNCTURE: CPT

## 2024-08-06 PROCEDURE — 80053 COMPREHEN METABOLIC PANEL: CPT

## 2024-08-21 ENCOUNTER — APPOINTMENT (OUTPATIENT)
Dept: CARDIOLOGY | Facility: CLINIC | Age: 84
End: 2024-08-21
Payer: MEDICARE

## 2024-08-21 VITALS
HEART RATE: 60 BPM | DIASTOLIC BLOOD PRESSURE: 82 MMHG | WEIGHT: 154 LBS | SYSTOLIC BLOOD PRESSURE: 126 MMHG | BODY MASS INDEX: 30.23 KG/M2 | HEIGHT: 60 IN

## 2024-08-21 DIAGNOSIS — E66.9 CLASS 1 OBESITY WITH BODY MASS INDEX (BMI) OF 30.0 TO 30.9 IN ADULT, UNSPECIFIED OBESITY TYPE, UNSPECIFIED WHETHER SERIOUS COMORBIDITY PRESENT: ICD-10-CM

## 2024-08-21 DIAGNOSIS — M06.9 RHEUMATOID ARTHRITIS, INVOLVING UNSPECIFIED SITE, UNSPECIFIED WHETHER RHEUMATOID FACTOR PRESENT (MULTI): ICD-10-CM

## 2024-08-21 DIAGNOSIS — I10 ESSENTIAL HYPERTENSION: ICD-10-CM

## 2024-08-21 DIAGNOSIS — E78.2 MIXED HYPERLIPIDEMIA: ICD-10-CM

## 2024-08-21 DIAGNOSIS — Z87.891 H/O NICOTINE DEPENDENCE: ICD-10-CM

## 2024-08-21 DIAGNOSIS — R01.1 HEART MURMUR: ICD-10-CM

## 2024-08-21 DIAGNOSIS — I25.10 CORONARY ARTERY DISEASE INVOLVING NATIVE CORONARY ARTERY OF NATIVE HEART WITHOUT ANGINA PECTORIS: ICD-10-CM

## 2024-08-21 PROCEDURE — 99214 OFFICE O/P EST MOD 30 MIN: CPT | Performed by: INTERNAL MEDICINE

## 2024-08-21 PROCEDURE — 1036F TOBACCO NON-USER: CPT | Performed by: INTERNAL MEDICINE

## 2024-08-21 PROCEDURE — 1159F MED LIST DOCD IN RCRD: CPT | Performed by: INTERNAL MEDICINE

## 2024-08-21 PROCEDURE — 3079F DIAST BP 80-89 MM HG: CPT | Performed by: INTERNAL MEDICINE

## 2024-08-21 PROCEDURE — 3074F SYST BP LT 130 MM HG: CPT | Performed by: INTERNAL MEDICINE

## 2024-08-21 NOTE — PROGRESS NOTES
CARDIOLOGY OFFICE VISIT      CHIEF COMPLAINT      HISTORY OF PRESENT ILLNESS  The patient states that she has been feeling well.  She denies chest discomfort or symptoms of myocardial ischemia.  She does have some dyspnea when climbing the stairs.  Otherwise, she is not bothered by dyspnea.  She denies any prolonged palpitations, presyncope, or syncope.  She denies any problem with her current medications.  It has been a while since she had an echocardiogram performed.  I told her like to get echocardiogram to further evaluate her cardiac murmur.    Impression:  Coronary disease, no angina  Hypertension  Hyperlipidemia unable tolerate statins  Rheumatoid arthritis, severe  Obesity     Please excuse any errors in grammar or translation related to this dictation. Voice recognition software was utilized to prepare this document.     Past Medical History  Past Medical History:   Diagnosis Date    Arthritis        Social History  Social History     Tobacco Use    Smoking status: Former     Current packs/day: 0.00     Types: Cigarettes     Quit date:      Years since quittin.6    Smokeless tobacco: Never   Vaping Use    Vaping status: Not on file   Substance Use Topics    Alcohol use: Yes     Comment: 4-5x a year    Drug use: Never       Family History     Family History   Problem Relation Name Age of Onset    Other (htn) Mother          Allergies:  Allergies   Allergen Reactions    Shellfish Containing Products Unknown    Statins-Hmg-Coa Reductase Inhibitors Unknown        Outpatient Medications:  Current Outpatient Medications   Medication Instructions    amLODIPine (Norvasc) 10 mg tablet 1 tablet, oral, Daily    amLODIPine (NORVASC) 5 mg, oral, 2 times daily    aspirin 81 mg chewable tablet 2 tablets, oral, Daily    CALCIUM CITRATE-VITAMIN D3 ORAL oral,  TAKE AS DIRECTED PER PACKAGE INSTRUCTIONS.<BR>    cholecalciferol (Vitamin D-3) 50 mcg (2,000 unit) capsule 1 capsule, oral, 2 times weekly     dextromethorphan-guaifenesin (Robitussin DM)  mg/5 mL oral liquid 5 mL, oral, Every 4 hours PRN    folic acid (Folvite) 1 mg tablet 1 tablet, oral, Once Weekly    inFLIXimab (Remicade) 100 mg injection intravenous,  USE AS DIRECTED.    ipratropium-albuteroL (Duo-Neb) 0.5-2.5 mg/3 mL nebulizer solution 3 mL, nebulization, Every 2 hour PRN    methotrexate (TREXALL) 12.5 mg, oral, Every 7 days    metoprolol succinate XL (TOPROL-XL) 100 mg, oral, Daily, AS DIRECTED    potassium chloride CR 10 mEq ER tablet 10 mEq, oral, Daily    predniSONE (DELTASONE) 5 mg, oral, Daily          REVIEW OF SYSTEMS  Review of Systems   All other systems reviewed and are negative.        VITALS  Vitals:    08/21/24 0855   BP: 126/82   Pulse: 60       PHYSICAL EXAM  Vitals reviewed.   Constitutional:       Appearance: Normal and healthy appearance. Well-developed and not in distress.   Eyes:      Conjunctiva/sclera: Conjunctivae normal.      Pupils: Pupils are equal, round, and reactive to light.   Neck:      Vascular: No JVR. JVD normal.   Pulmonary:      Effort: Pulmonary effort is normal.      Breath sounds: Normal breath sounds. No wheezing. No rhonchi. No rales.   Chest:      Chest wall: Not tender to palpatation.   Cardiovascular:      PMI at left midclavicular line. Normal rate. Regular rhythm. Normal S1. Normal S2.       Murmurs: There is a grade 2/6 systolic murmur at the back.      No gallop.  No click. No rub.   Pulses:     Intact distal pulses.   Edema:     Peripheral edema absent.   Abdominal:      Tenderness: There is no abdominal tenderness.   Musculoskeletal: Normal range of motion.         General: No tenderness.      Cervical back: Normal range of motion. Skin:     General: Skin is warm and dry.   Neurological:      General: No focal deficit present.      Mental Status: Alert and oriented to person, place and time.   Psychiatric:         Behavior: Behavior is cooperative.           ASSESSMENT AND PLAN  Diagnoses and  all orders for this visit:  Coronary artery disease involving native coronary artery of native heart without angina pectoris  Essential hypertension  Mixed hyperlipidemia  Class 1 obesity with body mass index (BMI) of 30.0 to 30.9 in adult, unspecified obesity type, unspecified whether serious comorbidity present  Rheumatoid arthritis, involving unspecified site, unspecified whether rheumatoid factor present (Multi)  H/O nicotine dependence  Heart murmur      [unfilled]

## 2024-08-21 NOTE — PATIENT INSTRUCTIONS
Patient to follow up in 1 year with Dr. Pranay Tse MD      Office will arrange an Echo for you in near future and call results.   This is just to follow up on the heart murmur we hear on assessment today.     No other changes today.   Continue same medications and treatments.   Patient educated on proper medication use.   Patient educated on risk factor modification.   Please bring any lab results from other providers / physicians to your next appointment.     Please bring all medicines, vitamins, and herbal supplements with you when you come to the office.     Prescriptions will not be filled unless you are compliant with your follow up appointments or have a follow up appointment scheduled as per instruction of your physician. Refills should be requested at the time of your visit.    I, Rene Merritt RN am scribing for and in the presence of Dr. Pranay Salomon MD

## 2024-09-06 ENCOUNTER — HOSPITAL ENCOUNTER (OUTPATIENT)
Dept: CARDIOLOGY | Facility: CLINIC | Age: 84
Discharge: HOME | End: 2024-09-06
Payer: MEDICARE

## 2024-09-06 DIAGNOSIS — I25.10 CORONARY ARTERY DISEASE INVOLVING NATIVE CORONARY ARTERY OF NATIVE HEART WITHOUT ANGINA PECTORIS: ICD-10-CM

## 2024-09-06 DIAGNOSIS — R01.1 HEART MURMUR: ICD-10-CM

## 2024-09-06 PROCEDURE — 93306 TTE W/DOPPLER COMPLETE: CPT

## 2024-09-06 PROCEDURE — 93306 TTE W/DOPPLER COMPLETE: CPT | Performed by: INTERNAL MEDICINE

## 2024-09-07 LAB
AORTIC VALVE MEAN GRADIENT: 7 MMHG
AORTIC VALVE PEAK VELOCITY: 1.83 M/S
AV PEAK GRADIENT: 13.4 MMHG
AVA (PEAK VEL): 6.08 CM2
AVA (VTI): 3.52 CM2
EJECTION FRACTION APICAL 4 CHAMBER: 51
EJECTION FRACTION: 63 %
LEFT VENTRICLE INTERNAL DIMENSION DIASTOLE: 3.1 CM (ref 3.5–6)
LEFT VENTRICULAR OUTFLOW TRACT DIAMETER: 2 CM
LV EJECTION FRACTION BIPLANE: 61 %
MITRAL VALVE E/A RATIO: 0.61
RIGHT VENTRICLE PEAK SYSTOLIC PRESSURE: 31.1 MMHG

## 2024-09-11 ENCOUNTER — TELEPHONE (OUTPATIENT)
Dept: CARDIOLOGY | Facility: CLINIC | Age: 84
End: 2024-09-11
Payer: MEDICARE

## 2024-09-11 DIAGNOSIS — I07.1 MODERATE TRICUSPID REGURGITATION: ICD-10-CM

## 2024-09-11 NOTE — TELEPHONE ENCOUNTER
----- Message from Pranay Salomon sent at 9/9/2024  8:05 AM EDT -----  Normal left ventricular systolic function, moderate tricuspid regurgitation, recheck echocardiogram in approximately 1 year prior to next office  ----- Message -----  From: Matt, Syngo - Cardiology Results In  Sent: 9/7/2024  10:30 AM EDT  To: Pranay Salomon MD

## 2024-09-11 NOTE — TELEPHONE ENCOUNTER
Call placed to patient and left detailed voice mail, as she identified herself by first and last name.  Order placed for echo for 1 year.

## 2024-10-10 ENCOUNTER — HOSPITAL ENCOUNTER (EMERGENCY)
Facility: HOSPITAL | Age: 84
Discharge: HOME | End: 2024-10-10
Payer: MEDICARE

## 2024-10-10 ENCOUNTER — APPOINTMENT (OUTPATIENT)
Dept: RADIOLOGY | Facility: HOSPITAL | Age: 84
End: 2024-10-10
Payer: MEDICARE

## 2024-10-10 ENCOUNTER — HOSPITAL ENCOUNTER (EMERGENCY)
Dept: CARDIOLOGY | Facility: HOSPITAL | Age: 84
Discharge: HOME | End: 2024-10-10
Payer: MEDICARE

## 2024-10-10 VITALS
SYSTOLIC BLOOD PRESSURE: 135 MMHG | OXYGEN SATURATION: 96 % | RESPIRATION RATE: 15 BRPM | DIASTOLIC BLOOD PRESSURE: 64 MMHG | TEMPERATURE: 97.9 F | HEIGHT: 62 IN | WEIGHT: 160 LBS | HEART RATE: 80 BPM | BODY MASS INDEX: 29.44 KG/M2

## 2024-10-10 DIAGNOSIS — M43.6 TORTICOLLIS, ACUTE: ICD-10-CM

## 2024-10-10 DIAGNOSIS — N28.1 BILATERAL RENAL CYSTS: ICD-10-CM

## 2024-10-10 DIAGNOSIS — M54.50 ACUTE LEFT-SIDED LOW BACK PAIN WITHOUT SCIATICA: Primary | ICD-10-CM

## 2024-10-10 LAB
ALBUMIN SERPL BCP-MCNC: 3.5 G/DL (ref 3.4–5)
ALP SERPL-CCNC: 48 U/L (ref 33–136)
ALT SERPL W P-5'-P-CCNC: 8 U/L (ref 7–45)
ANION GAP SERPL CALC-SCNC: 10 MMOL/L (ref 10–20)
APPEARANCE UR: CLEAR
AST SERPL W P-5'-P-CCNC: 16 U/L (ref 9–39)
BASOPHILS # BLD AUTO: 0.02 X10*3/UL (ref 0–0.1)
BASOPHILS NFR BLD AUTO: 0.2 %
BILIRUB SERPL-MCNC: 0.6 MG/DL (ref 0–1.2)
BILIRUB UR STRIP.AUTO-MCNC: NEGATIVE MG/DL
BUN SERPL-MCNC: 16 MG/DL (ref 6–23)
CALCIUM SERPL-MCNC: 9.5 MG/DL (ref 8.6–10.3)
CHLORIDE SERPL-SCNC: 102 MMOL/L (ref 98–107)
CO2 SERPL-SCNC: 29 MMOL/L (ref 21–32)
COLOR UR: NORMAL
CREAT SERPL-MCNC: 0.62 MG/DL (ref 0.5–1.05)
EGFRCR SERPLBLD CKD-EPI 2021: 89 ML/MIN/1.73M*2
EOSINOPHIL # BLD AUTO: 0.07 X10*3/UL (ref 0–0.4)
EOSINOPHIL NFR BLD AUTO: 0.9 %
ERYTHROCYTE [DISTWIDTH] IN BLOOD BY AUTOMATED COUNT: 13.9 % (ref 11.5–14.5)
GLUCOSE SERPL-MCNC: 92 MG/DL (ref 74–99)
GLUCOSE UR STRIP.AUTO-MCNC: NORMAL MG/DL
HCT VFR BLD AUTO: 37.6 % (ref 36–46)
HGB BLD-MCNC: 11.8 G/DL (ref 12–16)
IMM GRANULOCYTES # BLD AUTO: 0.02 X10*3/UL (ref 0–0.5)
IMM GRANULOCYTES NFR BLD AUTO: 0.2 % (ref 0–0.9)
KETONES UR STRIP.AUTO-MCNC: NEGATIVE MG/DL
LEUKOCYTE ESTERASE UR QL STRIP.AUTO: NEGATIVE
LYMPHOCYTES # BLD AUTO: 0.98 X10*3/UL (ref 0.8–3)
LYMPHOCYTES NFR BLD AUTO: 12.2 %
MCH RBC QN AUTO: 27.6 PG (ref 26–34)
MCHC RBC AUTO-ENTMCNC: 31.4 G/DL (ref 32–36)
MCV RBC AUTO: 88 FL (ref 80–100)
MONOCYTES # BLD AUTO: 0.44 X10*3/UL (ref 0.05–0.8)
MONOCYTES NFR BLD AUTO: 5.5 %
NEUTROPHILS # BLD AUTO: 6.49 X10*3/UL (ref 1.6–5.5)
NEUTROPHILS NFR BLD AUTO: 81 %
NITRITE UR QL STRIP.AUTO: NEGATIVE
NRBC BLD-RTO: 0 /100 WBCS (ref 0–0)
PH UR STRIP.AUTO: 7.5 [PH]
PLATELET # BLD AUTO: 264 X10*3/UL (ref 150–450)
POTASSIUM SERPL-SCNC: 3.5 MMOL/L (ref 3.5–5.3)
PROT SERPL-MCNC: 7.1 G/DL (ref 6.4–8.2)
PROT UR STRIP.AUTO-MCNC: NEGATIVE MG/DL
RBC # BLD AUTO: 4.27 X10*6/UL (ref 4–5.2)
RBC # UR STRIP.AUTO: NEGATIVE /UL
SODIUM SERPL-SCNC: 137 MMOL/L (ref 136–145)
SP GR UR STRIP.AUTO: 1.02
UROBILINOGEN UR STRIP.AUTO-MCNC: NORMAL MG/DL
WBC # BLD AUTO: 8 X10*3/UL (ref 4.4–11.3)

## 2024-10-10 PROCEDURE — 74176 CT ABD & PELVIS W/O CONTRAST: CPT

## 2024-10-10 PROCEDURE — 81003 URINALYSIS AUTO W/O SCOPE: CPT | Performed by: PHYSICIAN ASSISTANT

## 2024-10-10 PROCEDURE — 74176 CT ABD & PELVIS W/O CONTRAST: CPT | Mod: FOREIGN READ | Performed by: RADIOLOGY

## 2024-10-10 PROCEDURE — 72125 CT NECK SPINE W/O DYE: CPT

## 2024-10-10 PROCEDURE — 72131 CT LUMBAR SPINE W/O DYE: CPT | Mod: RCN

## 2024-10-10 PROCEDURE — 99285 EMERGENCY DEPT VISIT HI MDM: CPT

## 2024-10-10 PROCEDURE — 2500000005 HC RX 250 GENERAL PHARMACY W/O HCPCS: Performed by: PHYSICIAN ASSISTANT

## 2024-10-10 PROCEDURE — 72131 CT LUMBAR SPINE W/O DYE: CPT | Mod: FOREIGN READ | Performed by: RADIOLOGY

## 2024-10-10 PROCEDURE — 36415 COLL VENOUS BLD VENIPUNCTURE: CPT | Performed by: PHYSICIAN ASSISTANT

## 2024-10-10 PROCEDURE — 80053 COMPREHEN METABOLIC PANEL: CPT | Performed by: PHYSICIAN ASSISTANT

## 2024-10-10 PROCEDURE — 72125 CT NECK SPINE W/O DYE: CPT | Performed by: RADIOLOGY

## 2024-10-10 PROCEDURE — 85025 COMPLETE CBC W/AUTO DIFF WBC: CPT | Performed by: PHYSICIAN ASSISTANT

## 2024-10-10 PROCEDURE — 2500000001 HC RX 250 WO HCPCS SELF ADMINISTERED DRUGS (ALT 637 FOR MEDICARE OP): Performed by: PHYSICIAN ASSISTANT

## 2024-10-10 RX ORDER — HYDROCODONE BITARTRATE AND ACETAMINOPHEN 5; 325 MG/1; MG/1
1 TABLET ORAL ONCE
Status: COMPLETED | OUTPATIENT
Start: 2024-10-10 | End: 2024-10-10

## 2024-10-10 RX ORDER — LIDOCAINE 560 MG/1
2 PATCH PERCUTANEOUS; TOPICAL; TRANSDERMAL ONCE
Status: DISCONTINUED | OUTPATIENT
Start: 2024-10-10 | End: 2024-10-10 | Stop reason: HOSPADM

## 2024-10-10 RX ORDER — TIZANIDINE 2 MG/1
2 TABLET ORAL EVERY 6 HOURS PRN
Qty: 28 TABLET | Refills: 0 | Status: SHIPPED | OUTPATIENT
Start: 2024-10-10 | End: 2024-10-17

## 2024-10-10 RX ORDER — ONDANSETRON 4 MG/1
4 TABLET, ORALLY DISINTEGRATING ORAL ONCE
Status: COMPLETED | OUTPATIENT
Start: 2024-10-10 | End: 2024-10-10

## 2024-10-10 RX ORDER — MELOXICAM 15 MG/1
15 TABLET ORAL DAILY
Qty: 30 TABLET | Refills: 0 | Status: SHIPPED | OUTPATIENT
Start: 2024-10-10 | End: 2024-11-09

## 2024-10-10 RX ORDER — LIDOCAINE 50 MG/G
1 PATCH TOPICAL DAILY
Qty: 30 PATCH | Refills: 0 | Status: SHIPPED | OUTPATIENT
Start: 2024-10-10

## 2024-10-10 RX ADMIN — ONDANSETRON 4 MG: 4 TABLET, ORALLY DISINTEGRATING ORAL at 15:14

## 2024-10-10 RX ADMIN — HYDROCODONE BITARTRATE AND ACETAMINOPHEN 1 TABLET: 5; 325 TABLET ORAL at 15:14

## 2024-10-10 RX ADMIN — LIDOCAINE 2 PATCH: 4 PATCH TOPICAL at 15:14

## 2024-10-10 ASSESSMENT — LIFESTYLE VARIABLES
HAVE PEOPLE ANNOYED YOU BY CRITICIZING YOUR DRINKING: NO
TOTAL SCORE: 0
EVER HAD A DRINK FIRST THING IN THE MORNING TO STEADY YOUR NERVES TO GET RID OF A HANGOVER: NO
HAVE YOU EVER FELT YOU SHOULD CUT DOWN ON YOUR DRINKING: NO
EVER FELT BAD OR GUILTY ABOUT YOUR DRINKING: NO

## 2024-10-10 ASSESSMENT — PAIN - FUNCTIONAL ASSESSMENT
PAIN_FUNCTIONAL_ASSESSMENT: 0-10
PAIN_FUNCTIONAL_ASSESSMENT: 0-10

## 2024-10-10 ASSESSMENT — PAIN DESCRIPTION - PAIN TYPE: TYPE: ACUTE PAIN

## 2024-10-10 ASSESSMENT — COLUMBIA-SUICIDE SEVERITY RATING SCALE - C-SSRS
2. HAVE YOU ACTUALLY HAD ANY THOUGHTS OF KILLING YOURSELF?: NO
6. HAVE YOU EVER DONE ANYTHING, STARTED TO DO ANYTHING, OR PREPARED TO DO ANYTHING TO END YOUR LIFE?: NO
1. IN THE PAST MONTH, HAVE YOU WISHED YOU WERE DEAD OR WISHED YOU COULD GO TO SLEEP AND NOT WAKE UP?: NO

## 2024-10-10 ASSESSMENT — PAIN SCALES - GENERAL
PAINLEVEL_OUTOF10: 9
PAINLEVEL_OUTOF10: 6

## 2024-10-10 ASSESSMENT — PAIN DESCRIPTION - LOCATION: LOCATION: BACK

## 2024-10-10 NOTE — ED PROVIDER NOTES
"HPI   Chief Complaint   Patient presents with   • Back Pain     Left lower back pain x 1 week and \"I couldn't take it anymore\"        The patient is a very pleasant 84-year-old female who presents to the emergency department with 2 complaints.  The patient's first complaint is right sided neck pain that she has had for over a month.  The pain is constant and is worse with movement alleviated with rest.  She denies any injuries to her neck.  She denies any numbness, tingling or weakness to her upper extremities.  She denies any headache, blurry vision, dizziness, near syncope.  She does take the occasional Tylenol for her neck pain with no relief.  She did see her physician about this a few weeks ago and the patient reports that he told her to get a different pillow.  He did not prescribe any medications for her pain and did not send her to physical therapy.  Her second complaint is left-sided low back pain for over a week.  The pain is constant and worse with walking and movement.  She denies any abdominal pain or flank pain, denies any dysuria, hematuria, frequency of urination.  She denies any saddle paresthesias or any bladder or bowel dysfunction.  She denies any history of aneurysms.  She denies any skin rashes no history of shingles.  The pain does not radiate beyond the left lower lumbar region.  The patient states she has had a previous fall resulting in rib injuries on the left side.  She rates the pain in her back as 6 out of a 10 is described as sharp and stabbing.  Worse with weightbearing and movement alleviated with rest.  She does have a history of rheumatoid arthritis, sensorineural hearing loss, mixed hyperlipidemia, hyperparathyroidism, hypertension, CAD, previous stroke, chronic rhinitis, elevated BMI, dermatofibroma, kidney stones, nicotine dependence, history of heart murmur.  Was admitted to the hospital back in fibber 25th 2022 for flu or shortness of breath and does have a history of low back " pain with radiculopathy according to the discharge summary.      History provided by:  Patient, medical records and relative          Patient History   Past Medical History:   Diagnosis Date   • Arthritis      Past Surgical History:   Procedure Laterality Date   • HYSTERECTOMY     • OTHER SURGICAL HISTORY  2022    Colonoscopy   • OTHER SURGICAL HISTORY  2022    Neck surgery   • OTHER SURGICAL HISTORY  2022    Breast biopsy   • OTHER SURGICAL HISTORY  2022    Total hysterectomy abdominal   • OTHER SURGICAL HISTORY  2022    Tubal ligation   • OTHER SURGICAL HISTORY  2022    Knee replacement   • SKIN LESION EXCISION Left     removed from top of left foot     Family History   Problem Relation Name Age of Onset   • Other (htn) Mother       Social History     Tobacco Use   • Smoking status: Former     Current packs/day: 0.00     Types: Cigarettes     Quit date:      Years since quittin.8   • Smokeless tobacco: Never   Vaping Use   • Vaping status: Not on file   Substance Use Topics   • Alcohol use: Yes     Comment: 4-5x a year   • Drug use: Never       Physical Exam   ED Triage Vitals [10/10/24 1423]   Temperature Heart Rate Respirations BP   36.6 °C (97.9 °F) 80 15 135/64      Pulse Ox Temp src Heart Rate Source Patient Position   96 % -- -- --      BP Location FiO2 (%)     -- --       Physical Exam  Vitals and nursing note reviewed.   Constitutional:       General: She is awake. She is not in acute distress.     Appearance: Normal appearance. She is well-developed, well-groomed and overweight. She is not ill-appearing, toxic-appearing or diaphoretic.      Comments: Temperature 36.6 heart rate 80 respirations 15 blood pressure 135/64, pulse ox is 96% on room air   HENT:      Head: Normocephalic and atraumatic.      Right Ear: Tympanic membrane, ear canal and external ear normal.      Left Ear: Tympanic membrane, ear canal and external ear normal.      Nose: Nose normal.       Mouth/Throat:      Mouth: Mucous membranes are moist.      Pharynx: Oropharynx is clear.   Eyes:      Extraocular Movements: Extraocular movements intact.      Conjunctiva/sclera: Conjunctivae normal.      Pupils: Pupils are equal, round, and reactive to light.   Neck:      Trachea: Trachea and phonation normal.        Comments: Right-sided SCM and trapezial tenderness with spasm no midline tenderness  Cardiovascular:      Rate and Rhythm: Normal rate and regular rhythm.      Pulses: Normal pulses.      Heart sounds: Normal heart sounds.   Pulmonary:      Effort: Pulmonary effort is normal.      Breath sounds: Normal breath sounds. No wheezing, rhonchi or rales.   Abdominal:      General: Abdomen is flat. Bowel sounds are normal.      Palpations: Abdomen is soft. There is no mass.      Tenderness: There is no abdominal tenderness. There is no guarding.   Musculoskeletal:         General: Tenderness present. No swelling or signs of injury.      Right shoulder: Normal.      Left shoulder: Normal.      Right upper arm: Normal.      Left upper arm: Normal.      Right elbow: Normal.      Left elbow: Normal.      Right forearm: Normal.      Left forearm: Normal.      Right wrist: Normal.      Left wrist: Normal.      Right hand: No swelling, deformity, tenderness or bony tenderness.      Left hand: No swelling, deformity, tenderness or bony tenderness.      Cervical back: Neck supple. Torticollis and tenderness present. Muscular tenderness present. No spinous process tenderness. Decreased range of motion.      Thoracic back: Normal.      Lumbar back: Spasms, tenderness and bony tenderness present. Decreased range of motion.        Back:       Right hip: Normal.      Left hip: Normal.      Right upper leg: Normal.      Left upper leg: Normal.      Right knee: Normal.      Left knee: Normal.      Right lower leg: Normal.      Left lower leg: Normal.      Right ankle: Normal.      Left ankle: Normal.      Right foot: Normal.       Left foot: Normal.      Comments: Left-sided lower lumbar paraspinal muscle spasm with tenderness over the SI joint.  No swelling or erythema no bruising     Skin:     General: Skin is warm and dry.      Capillary Refill: Capillary refill takes less than 2 seconds.      Findings: No rash.   Neurological:      General: No focal deficit present.      Mental Status: She is alert and oriented to person, place, and time. Mental status is at baseline.   Psychiatric:         Mood and Affect: Mood normal.         Behavior: Behavior normal. Behavior is cooperative.         Thought Content: Thought content normal.         Judgment: Judgment normal.           ED Course & MDM   Diagnoses as of 10/10/24 1752   Acute left-sided low back pain without sciatica   Torticollis, acute   Bilateral renal cysts                 No data recorded     [place] Coma Scale Score: 15 (10/10/24 1424 : Rickie Velazquez RN)                           Medical Decision Making  Temperature 36.6 °C, heart rate 80, respirations 15, blood pressure 135/64, pulse ox was 96% on room air  Patient does have a history of kidney stones as well as a previous fall.  I have ordered imaging of the cervical spine, lumbar spine as well as a CT abdomen pelvis without contrast to rule out kidney stone.  I have ordered lab work and a urinalysis with culture.  EKG interpreted by me at 1429 hrs. Shows normal sinus rhythm with a left bundle branch block rate 74, , QRS is 132  QTc was 459 no STEMI.  Unchanged from previous EKG back on 25/2024 which also shows a left bundle branch block as well as previous EKG done back on 17 July.  2023  White count 8 hemoglobin point hematocrit 37.6 platelet count was 264 metabolic unremarkable.  CT of the abdomen pelvis without contrast shows bilateral renal cyst there are nonobstructing right renal calculi and there are high density lesions in both kidneys most likely resenting hyperdense cyst which would be best evaluated by  an outpatient MRI or ultrasound.  Diverticulosis without diverticulitis there is diffuse degenerative changes of the lumbar sacral spine most pronounced at L4-5 and L5-S1.  No evidence of any fractures or traumatic subluxation.  CT scan of the cervical spine shows no evidence of any displacement acute displaced fracture multilevel productive degenerative changes and straightening of the cervical lordosis concern for muscle spasm.  Urinalysis negative for UTI negative nitrites negative leuks esterase negative blood.  At this point, I do not suspect cauda equina syndrome, epidural abscess or AAA, there is no sign to suggest kidney stone pyelonephritis or cystitis.  The pain in her back appears musculoskeletal.  The pain is worse with movement.  She denies any history of IV drug use has been no recent epidural injections.  Concerning her neck pain more suspicious of torticollis based on her imaging, physical exam and description of her pain.  At this point time I do not feel the patient requires any further workup.  She was discharged home with prescription for Mobic, topical lidocaine patches, tizanidine and [facility].  The patient was warned of the sedating effects of the muscle relaxer.  I advised him to return back to the ER sooner with any concerns or worsening of symptoms.  Both the patient and daughter verbalized understand agreement with the plan disposition all questions answered prior to discharge        Procedure  Procedures     Hima Howard PA-C  10/10/24 3384

## 2024-10-11 ENCOUNTER — OFFICE VISIT (OUTPATIENT)
Dept: ORTHOPEDIC SURGERY | Facility: CLINIC | Age: 84
End: 2024-10-11
Payer: MEDICARE

## 2024-10-11 VITALS — HEIGHT: 62 IN | WEIGHT: 160 LBS | BODY MASS INDEX: 29.44 KG/M2

## 2024-10-11 DIAGNOSIS — M54.50 ACUTE LEFT-SIDED LOW BACK PAIN WITHOUT SCIATICA: ICD-10-CM

## 2024-10-11 DIAGNOSIS — M51.369 DEGENERATION OF INTERVERTEBRAL DISC OF LUMBAR REGION, UNSPECIFIED WHETHER PAIN PRESENT: Primary | ICD-10-CM

## 2024-10-11 DIAGNOSIS — M50.30 DDD (DEGENERATIVE DISC DISEASE), CERVICAL: ICD-10-CM

## 2024-10-11 DIAGNOSIS — M43.6 TORTICOLLIS, ACUTE: ICD-10-CM

## 2024-10-11 LAB — HOLD SPECIMEN: NORMAL

## 2024-10-11 PROCEDURE — 93005 ELECTROCARDIOGRAM TRACING: CPT

## 2024-10-11 PROCEDURE — 99213 OFFICE O/P EST LOW 20 MIN: CPT | Performed by: INTERNAL MEDICINE

## 2024-10-11 ASSESSMENT — ENCOUNTER SYMPTOMS
LOSS OF SENSATION IN FEET: 0
OCCASIONAL FEELINGS OF UNSTEADINESS: 0
DEPRESSION: 0

## 2024-10-11 ASSESSMENT — PATIENT HEALTH QUESTIONNAIRE - PHQ9
1. LITTLE INTEREST OR PLEASURE IN DOING THINGS: NOT AT ALL
SUM OF ALL RESPONSES TO PHQ9 QUESTIONS 1 AND 2: 0
2. FEELING DOWN, DEPRESSED OR HOPELESS: NOT AT ALL

## 2024-10-11 NOTE — PROGRESS NOTES
Acute Injury New Patient Visit    CC:   Chief Complaint   Patient presents with    Neck - Pain     Pain for greater than 1 month, she did go to ER yesterday and had CT of her cervical neck done     Lower Back - Pain     Pain for greater than one month, she did go to ER yesterday and had CT of her lumbar spine done.       HPI: Nicolas is a 84 y.o. female presents today for evaluation for acute on chronic neck and low back pain which started more than a month ago. No trauma or fall. She was evaluated in the ER where CT scans were taken. She denies any tingling, numbness or radiating pain. No bowel or bladder incontinence. She states that she has rheumatoid arthritis. She is here for initial evaluation.  She is on chronic low-dose of oral prednisone 5 mg.        Review of Systems   GENERAL: Negative for malaise, significant weight loss, fever  MUSCULOSKELETAL: See HPI  NEURO:  Negative for numbness / tingling     Past Medical History  Past Medical History:   Diagnosis Date    Arthritis        Medication review  Medication Documentation Review Audit       Reviewed by Isabelle Conley MA (Medical Assistant) on 10/11/24 at 0819      Medication Order Taking? Sig Documenting Provider Last Dose Status   amLODIPine (Norvasc) 10 mg tablet [id number] No Take 1 tablet (10 mg) by mouth once daily. Brad Chin MD Not Taking Active   amLODIPine (Norvasc) 5 mg tablet [id number] No Take 1 tablet (5 mg) by mouth 2 times a day.   Patient not taking: Reported on 8/21/2024    Pilo Heath MD Not Taking Active   aspirin 81 mg chewable tablet [id number] No Chew 2 tablets (162 mg) once daily. Brad Chin MD Taking Active   CALCIUM CITRATE-VITAMIN D3 ORAL [id number] No Take by mouth.  TAKE AS DIRECTED PER PACKAGE INSTRUCTIONS. Brad Chin MD Taking Active   cholecalciferol (Vitamin D-3) 50 mcg (2,000 unit) capsule [id number] No Take 1 capsule (50 mcg) by mouth 2 times a week. Brad Chin MD Taking Active    dextromethorphan-guaifenesin (Robitussin DM)  mg/5 mL oral liquid [id number] No Take 5 mL by mouth every 4 hours if needed for cough.   Patient not taking: Reported on 8/21/2024    Pilo Heath MD Not Taking Active   folic acid (Folvite) 1 mg tablet [id number] No Take 1 tablet (1 mg) by mouth 1 (one) time per week. Historical Provider, MD Taking Active   inFLIXimab (Remicade) 100 mg injection [id number] No Infuse into a venous catheter.   USE AS DIRECTED. Historical Provider, MD Taking Active   ipratropium-albuteroL (Duo-Neb) 0.5-2.5 mg/3 mL nebulizer solution [id number] No Take 3 mL by nebulization every 2 hours if needed for wheezing. Pilo Heath MD Taking Active   lidocaine (Lidoderm) 5 % patch [id number]  Place 1 patch over 12 hours on the skin once daily. Remove & discard patch within 12 hours or as directed by MD. Hima Howard PA-C  Active   meloxicam (Mobic) 15 mg tablet [id number]  Take 1 tablet (15 mg) by mouth once daily. Hima Howard PA-C  Active   metoprolol succinate XL (Toprol-XL) 100 mg 24 hr tablet [id number] No Take 1 tablet (100 mg) by mouth once daily. AS DIRECTED Historical Provider, MD Taking Active   potassium chloride CR 10 mEq ER tablet [id number] No Take 1 tablet (10 mEq) by mouth once daily. Historical Provider, MD Taking Active   predniSONE (Deltasone) 5 mg tablet [id number] No Take 1 tablet (5 mg) by mouth once daily. Historical Provider, MD Taking Active   tiZANidine (Zanaflex) 2 mg tablet [id number]  Take 1 tablet (2 mg) by mouth every 6 hours if needed for muscle spasms for up to 7 days. Hima Howard PA-C  Active                    Allergies  Allergies   Allergen Reactions    Shellfish Containing Products Unknown    Statins-Hmg-Coa Reductase Inhibitors Unknown       Social History  Social History     Socioeconomic History    Marital status:      Spouse name: Not on file    Number of children: Not on file    Years of education: Not on file    Highest  education level: Not on file   Occupational History    Not on file   Tobacco Use    Smoking status: Former     Current packs/day: 0.00     Types: Cigarettes     Quit date: 1982     Years since quittin.8    Smokeless tobacco: Never   Vaping Use    Vaping status: Not on file   Substance and Sexual Activity    Alcohol use: Yes     Comment: 4-5x a year    Drug use: Never    Sexual activity: Not Currently   Other Topics Concern    Not on file   Social History Narrative    Not on file     Social Determinants of Health     Financial Resource Strain: Patient Declined (2024)    Overall Financial Resource Strain (CARDIA)     Difficulty of Paying Living Expenses: Patient declined   Food Insecurity: Not on file   Transportation Needs: Patient Declined (2024)    PRAPARE - Transportation     Lack of Transportation (Medical): Patient declined     Lack of Transportation (Non-Medical): Patient declined   Physical Activity: Not on file   Stress: Not on file   Social Connections: Not on file   Intimate Partner Violence: Not on file   Housing Stability: Patient Declined (2024)    Housing Stability Vital Sign     Unable to Pay for Housing in the Last Year: Patient declined     Number of Places Lived in the Last Year: 1     Unstable Housing in the Last Year: Patient declined       Surgical History  Past Surgical History:   Procedure Laterality Date    HYSTERECTOMY      OTHER SURGICAL HISTORY  2022    Colonoscopy    OTHER SURGICAL HISTORY  2022    Neck surgery    OTHER SURGICAL HISTORY  2022    Breast biopsy    OTHER SURGICAL HISTORY  2022    Total hysterectomy abdominal    OTHER SURGICAL HISTORY  2022    Tubal ligation    OTHER SURGICAL HISTORY  2022    Knee replacement    SKIN LESION EXCISION Left     removed from top of left foot       Physical Exam:  GENERAL:  Patient is awake, alert, and oriented to person place and time.  Patient appears well nourished and well kept.  Affect  Calm, Not Acutely Distressed.  HEENT:  Normocephalic, Atraumatic, EOMI  CARDIOVASCULAR:  Hemodynamically stable.  RESPIRATORY:  Normal respirations with unlabored breathing.  Extremity: Cervical spine examination shows skin is intact.  Pain with forward flexion and reverse extension.  Pain with left and right lateral rotation.  There is no cervical midline tenderness.  Mild pain over the paraspinal muscles.  Negative Spurling test.  Satisfactory hand grasp on the right and left hand.  Deltoid strength is 5/5 on the right and left.    Lumbar spine examination shows skin is intact.  Pain with forward flexion and reverse extension.  Pain with left and right lateral rotation.  Mild pain over the SI joints.  Quadricep strength is 5/5 on the right.  Quadricep strength is 5/5 on the left.  Negative straight leg test on the right leg or left leg.      Diagnostics: CT scan reviewed  ECG 12 lead  Normal sinus rhythm  Left bundle branch block  Abnormal ECG  When compared with ECG of 25-FEB-2024 09:13,  No significant change was found      Procedure: None    Assessment:   Cervical DDD  Lumbar DDD     Plan: Nicolas presents today for initial evaluation for acute on chronic low back pain and neck pain which started more than a month ago. No obvious fractures were noted on imaging. We recommended physical therapy and tapered oral prednisone for a few days. She will follow-up with the spine team in 10-14 days for reevaluation.     No orders of the defined types were placed in this encounter.     At the conclusion of the visit there were no further questions by the patient/family regarding their plan of care.  Patient was instructed to call or return with any issues, questions, or concerns regarding their injury and/or treatment plan described above.     10/11/24 at 8:53 AM - Kee Sharp MD  Scribe Attestation  By signing my name below, IAnant Scribe   attest that this documentation has been prepared under the  direction and in the presence of Kee Sharp MD.    Office: [phone]    This note was prepared using voice recognition software.  The details of this note are correct and have been reviewed, and corrected to the best of my ability.  Some grammatical errors may persist related to the Dragon software.

## 2024-10-12 LAB
ATRIAL RATE: 74 BPM
P AXIS: 45 DEGREES
P OFFSET: 177 MS
P ONSET: 127 MS
PR INTERVAL: 182 MS
Q ONSET: 218 MS
QRS COUNT: 12 BEATS
QRS DURATION: 132 MS
QT INTERVAL: 414 MS
QTC CALCULATION(BAZETT): 459 MS
QTC FREDERICIA: 444 MS
R AXIS: -6 DEGREES
T AXIS: 43 DEGREES
T OFFSET: 425 MS
VENTRICULAR RATE: 74 BPM

## 2024-10-25 ENCOUNTER — HOSPITAL ENCOUNTER (OUTPATIENT)
Dept: RADIOLOGY | Facility: HOSPITAL | Age: 84
Discharge: HOME | End: 2024-10-25
Payer: MEDICARE

## 2024-10-25 ENCOUNTER — APPOINTMENT (OUTPATIENT)
Dept: ORTHOPEDIC SURGERY | Facility: CLINIC | Age: 84
End: 2024-10-25
Payer: MEDICARE

## 2024-10-25 DIAGNOSIS — M54.50 LUMBAR PAIN: Primary | ICD-10-CM

## 2024-10-25 DIAGNOSIS — M54.50 LUMBAR PAIN: ICD-10-CM

## 2024-10-25 PROCEDURE — 72110 X-RAY EXAM L-2 SPINE 4/>VWS: CPT

## 2024-10-25 NOTE — PROGRESS NOTES
Nicolas Mcleod is a 84 y.o. female who presents for Pain of the Spine (Lumbar Pain, CT Scan 10/10/24; Xrays Today).    HPI:  84-year-old female here for evaluation of low back pain.  She denies any fever chills nausea vomiting night sweats.  She has no bowel or bladder complaints.    Physical exam:  Well-nourished, well kept.No lymphangitis or lymphadenopathy in the examined extremities.  Gait normal.  Can stand on heels and toes.   Examination of the back shows no significant tenderness in the paraspinous musculature.  There is mildly decreased range of motion in all directions due to guarding/muscle spasms and pain at extremes.  There is good strength and no instability.  Examination of the lower extremities reveals no point tenderness, swelling, or deformity.  Range of motion of the hips, knees, and ankles are full without crepitance, instability, or exacerbation of pain.  Strength is 5/5 throughout.  No redness, abrasions, or lesions on extremities  Gross sensation intact in the extremities.   Affect normal.  Alert and oriented ×3.  Coordination normal.    Imaging studies:  AP lateral flexion-extension x-rays were ordered and reviewed today.    Assessment:  84-year-old female here for evaluation of low back pain.  She had an episode of back and neck pain around October 10, 2024, this was so bad that she went to the emergency department.  She followed up with the walk-in clinic at the [facility] on October 11, 2024.  She saw Dr. Sharp who treated her with some medication and physical therapy.  She starts therapy at the end of this month.  She is having back pain only, however at this visit the medication and lidocaine patches she has been using has helped her quite a bit.  She is not really describing much back pain today.  She has no leg symptoms.  Her x-rays shows some moderate degenerative changes throughout the lumbar spine.  She has a scoliosis of about 10 degrees.  She has a spondylolisthesis of L5  on S1.  She does not normally have chronic back pain, there was no significant precipitating event.  No history of back surgery.    Plan:  She starts physical therapy for her neck at the end of the month, I will add back therapy to that order, to include manual therapy with modalities.  She is going to see how the therapy goes, if she is still hurting in the next 4 to 6 weeks she can come back and see me otherwise I will see her as needed.    I have ordered and reviewed tests today, x-rays, CT.  I reviewed the notes from Dr. Sharp from October 11, 2024.  She has a  with her today that is a helpful and necessary historian.  This is a acute undiagnosed new problem with the potential to affect her bodily function.

## 2024-10-31 DIAGNOSIS — N28.1 RENAL CYST: ICD-10-CM

## 2024-10-31 DIAGNOSIS — M54.50 LOW BACK PAIN, UNSPECIFIED BACK PAIN LATERALITY, UNSPECIFIED CHRONICITY, UNSPECIFIED WHETHER SCIATICA PRESENT: ICD-10-CM

## 2024-11-04 RX ORDER — METHOTREXATE 2.5 MG/1
12.5 TABLET ORAL
Qty: 60 TABLET | Refills: 3 | Status: CANCELLED | OUTPATIENT
Start: 2024-11-04 | End: 2025-11-04

## 2024-11-07 PROCEDURE — RXMED WILLOW AMBULATORY MEDICATION CHARGE

## 2024-11-12 ENCOUNTER — PHARMACY VISIT (OUTPATIENT)
Dept: PHARMACY | Facility: CLINIC | Age: 84
End: 2024-11-12
Payer: COMMERCIAL

## 2024-12-02 ENCOUNTER — LAB (OUTPATIENT)
Dept: LAB | Facility: LAB | Age: 84
End: 2024-12-02
Payer: MEDICARE

## 2024-12-02 DIAGNOSIS — M05.79 RHEUMATOID ARTHRITIS WITH RHEUMATOID FACTOR OF MULTIPLE SITES WITHOUT ORGAN OR SYSTEMS INVOLVEMENT (MULTI): ICD-10-CM

## 2024-12-02 DIAGNOSIS — M05.79 RHEUMATOID ARTHRITIS WITH RHEUMATOID FACTOR OF MULTIPLE SITES WITHOUT ORGAN OR SYSTEMS INVOLVEMENT (MULTI): Primary | ICD-10-CM

## 2024-12-02 DIAGNOSIS — E78.49 OTHER HYPERLIPIDEMIA: Primary | ICD-10-CM

## 2024-12-02 DIAGNOSIS — I10 ESSENTIAL (PRIMARY) HYPERTENSION: ICD-10-CM

## 2024-12-02 DIAGNOSIS — E78.49 OTHER HYPERLIPIDEMIA: ICD-10-CM

## 2024-12-02 LAB
ALBUMIN SERPL BCP-MCNC: 3.4 G/DL (ref 3.4–5)
ALP SERPL-CCNC: 49 U/L (ref 33–136)
ALT SERPL W P-5'-P-CCNC: 10 U/L (ref 7–45)
ANION GAP SERPL CALC-SCNC: 9 MMOL/L (ref 10–20)
AST SERPL W P-5'-P-CCNC: 14 U/L (ref 9–39)
BASOPHILS # BLD AUTO: 0.04 X10*3/UL (ref 0–0.1)
BASOPHILS NFR BLD AUTO: 0.6 %
BILIRUB DIRECT SERPL-MCNC: 0.1 MG/DL (ref 0–0.3)
BILIRUB SERPL-MCNC: 0.3 MG/DL (ref 0–1.2)
BUN SERPL-MCNC: 19 MG/DL (ref 6–23)
CALCIUM SERPL-MCNC: 9.4 MG/DL (ref 8.6–10.3)
CHLORIDE SERPL-SCNC: 107 MMOL/L (ref 98–107)
CHOLEST SERPL-MCNC: 174 MG/DL (ref 0–199)
CHOLESTEROL/HDL RATIO: 2.9
CO2 SERPL-SCNC: 32 MMOL/L (ref 21–32)
CREAT SERPL-MCNC: 0.65 MG/DL (ref 0.5–1.05)
CRP SERPL-MCNC: 3.28 MG/DL
EGFRCR SERPLBLD CKD-EPI 2021: 88 ML/MIN/1.73M*2
EOSINOPHIL # BLD AUTO: 0.2 X10*3/UL (ref 0–0.4)
EOSINOPHIL NFR BLD AUTO: 3 %
ERYTHROCYTE [DISTWIDTH] IN BLOOD BY AUTOMATED COUNT: 14 % (ref 11.5–14.5)
ERYTHROCYTE [SEDIMENTATION RATE] IN BLOOD BY WESTERGREN METHOD: 42 MM/H (ref 0–30)
GLUCOSE SERPL-MCNC: 79 MG/DL (ref 74–99)
HCT VFR BLD AUTO: 37.2 % (ref 36–46)
HDLC SERPL-MCNC: 59.1 MG/DL
HGB BLD-MCNC: 11.4 G/DL (ref 12–16)
IMM GRANULOCYTES # BLD AUTO: 0.02 X10*3/UL (ref 0–0.5)
IMM GRANULOCYTES NFR BLD AUTO: 0.3 % (ref 0–0.9)
LDLC SERPL CALC-MCNC: 98 MG/DL
LYMPHOCYTES # BLD AUTO: 1.87 X10*3/UL (ref 0.8–3)
LYMPHOCYTES NFR BLD AUTO: 28.2 %
MCH RBC QN AUTO: 27.6 PG (ref 26–34)
MCHC RBC AUTO-ENTMCNC: 30.6 G/DL (ref 32–36)
MCV RBC AUTO: 90 FL (ref 80–100)
MONOCYTES # BLD AUTO: 0.6 X10*3/UL (ref 0.05–0.8)
MONOCYTES NFR BLD AUTO: 9 %
NEUTROPHILS # BLD AUTO: 3.91 X10*3/UL (ref 1.6–5.5)
NEUTROPHILS NFR BLD AUTO: 58.9 %
NON HDL CHOLESTEROL: 115 MG/DL (ref 0–149)
NRBC BLD-RTO: 0 /100 WBCS (ref 0–0)
PLATELET # BLD AUTO: 305 X10*3/UL (ref 150–450)
POTASSIUM SERPL-SCNC: 3.9 MMOL/L (ref 3.5–5.3)
PROT SERPL-MCNC: 6.9 G/DL (ref 6.4–8.2)
RBC # BLD AUTO: 4.13 X10*6/UL (ref 4–5.2)
SODIUM SERPL-SCNC: 144 MMOL/L (ref 136–145)
TRIGL SERPL-MCNC: 87 MG/DL (ref 0–149)
VLDL: 17 MG/DL (ref 0–40)
WBC # BLD AUTO: 6.6 X10*3/UL (ref 4.4–11.3)

## 2024-12-02 PROCEDURE — 80061 LIPID PANEL: CPT

## 2024-12-02 PROCEDURE — 80053 COMPREHEN METABOLIC PANEL: CPT

## 2024-12-02 PROCEDURE — 86140 C-REACTIVE PROTEIN: CPT

## 2024-12-02 PROCEDURE — 36415 COLL VENOUS BLD VENIPUNCTURE: CPT

## 2024-12-02 PROCEDURE — 85025 COMPLETE CBC W/AUTO DIFF WBC: CPT

## 2024-12-02 PROCEDURE — 82248 BILIRUBIN DIRECT: CPT

## 2024-12-02 PROCEDURE — 85652 RBC SED RATE AUTOMATED: CPT

## 2024-12-30 ENCOUNTER — HOSPITAL ENCOUNTER (OUTPATIENT)
Dept: RADIOLOGY | Facility: HOSPITAL | Age: 84
Discharge: HOME | End: 2024-12-30
Payer: MEDICARE

## 2024-12-30 DIAGNOSIS — M75.102 UNSPECIFIED ROTATOR CUFF TEAR OR RUPTURE OF LEFT SHOULDER, NOT SPECIFIED AS TRAUMATIC: ICD-10-CM

## 2024-12-30 PROCEDURE — 73221 MRI JOINT UPR EXTREM W/O DYE: CPT | Mod: LEFT SIDE | Performed by: RADIOLOGY

## 2024-12-30 PROCEDURE — 73221 MRI JOINT UPR EXTREM W/O DYE: CPT | Mod: LT

## 2024-12-31 ENCOUNTER — LAB (OUTPATIENT)
Dept: LAB | Facility: LAB | Age: 84
End: 2024-12-31
Payer: MEDICARE

## 2024-12-31 DIAGNOSIS — M54.50 LOW BACK PAIN, UNSPECIFIED BACK PAIN LATERALITY, UNSPECIFIED CHRONICITY, UNSPECIFIED WHETHER SCIATICA PRESENT: ICD-10-CM

## 2024-12-31 DIAGNOSIS — N28.1 RENAL CYST: ICD-10-CM

## 2024-12-31 DIAGNOSIS — I12.9 HYPERTENSIVE CHRONIC KIDNEY DISEASE WITH STAGE 1 THROUGH STAGE 4 CHRONIC KIDNEY DISEASE, OR UNSPECIFIED CHRONIC KIDNEY DISEASE: ICD-10-CM

## 2024-12-31 DIAGNOSIS — E78.49 OTHER HYPERLIPIDEMIA: ICD-10-CM

## 2024-12-31 DIAGNOSIS — R82.998 OTHER ABNORMAL FINDINGS IN URINE: ICD-10-CM

## 2024-12-31 DIAGNOSIS — R79.9 ABNORMAL FINDING OF BLOOD CHEMISTRY, UNSPECIFIED: ICD-10-CM

## 2024-12-31 DIAGNOSIS — Z13.89 ENCOUNTER FOR SCREENING FOR OTHER DISORDER: ICD-10-CM

## 2024-12-31 DIAGNOSIS — N18.2 CHRONIC KIDNEY DISEASE, STAGE 2 (MILD): ICD-10-CM

## 2024-12-31 LAB
APPEARANCE UR: CLEAR
BILIRUB UR STRIP.AUTO-MCNC: NEGATIVE MG/DL
COLOR UR: NORMAL
GLUCOSE UR STRIP.AUTO-MCNC: NORMAL MG/DL
KETONES UR STRIP.AUTO-MCNC: NEGATIVE MG/DL
LEUKOCYTE ESTERASE UR QL STRIP.AUTO: NEGATIVE
NITRITE UR QL STRIP.AUTO: NEGATIVE
PH UR STRIP.AUTO: 5.5 [PH]
PROT UR STRIP.AUTO-MCNC: NEGATIVE MG/DL
RBC # UR STRIP.AUTO: NEGATIVE /UL
SP GR UR STRIP.AUTO: 1.03
UROBILINOGEN UR STRIP.AUTO-MCNC: NORMAL MG/DL

## 2024-12-31 PROCEDURE — 87086 URINE CULTURE/COLONY COUNT: CPT

## 2024-12-31 PROCEDURE — 81003 URINALYSIS AUTO W/O SCOPE: CPT

## 2025-01-01 LAB — BACTERIA UR CULT: NORMAL

## 2025-01-03 LAB
LABORATORY COMMENT REPORT: NORMAL
LABORATORY COMMENT REPORT: NORMAL
PATH REPORT.FINAL DX SPEC: NORMAL
PATH REPORT.GROSS SPEC: NORMAL
PATH REPORT.RELEVANT HX SPEC: NORMAL
PATH REPORT.TOTAL CANCER: NORMAL

## 2025-01-08 ENCOUNTER — OFFICE VISIT (OUTPATIENT)
Dept: ORTHOPEDIC SURGERY | Facility: CLINIC | Age: 85
End: 2025-01-08
Payer: MEDICARE

## 2025-01-08 DIAGNOSIS — R30.0 DYSURIA: ICD-10-CM

## 2025-01-08 DIAGNOSIS — M19.019 GLENOHUMERAL ARTHRITIS: Primary | ICD-10-CM

## 2025-01-08 PROCEDURE — 2500000004 HC RX 250 GENERAL PHARMACY W/ HCPCS (ALT 636 FOR OP/ED): Performed by: ORTHOPAEDIC SURGERY

## 2025-01-08 PROCEDURE — 99213 OFFICE O/P EST LOW 20 MIN: CPT | Mod: 25 | Performed by: ORTHOPAEDIC SURGERY

## 2025-01-08 PROCEDURE — 99214 OFFICE O/P EST MOD 30 MIN: CPT | Performed by: ORTHOPAEDIC SURGERY

## 2025-01-08 PROCEDURE — 20610 DRAIN/INJ JOINT/BURSA W/O US: CPT | Mod: LT | Performed by: ORTHOPAEDIC SURGERY

## 2025-01-08 RX ORDER — BETAMETHASONE SODIUM PHOSPHATE AND BETAMETHASONE ACETATE 3; 3 MG/ML; MG/ML
2 INJECTION, SUSPENSION INTRA-ARTICULAR; INTRALESIONAL; INTRAMUSCULAR; SOFT TISSUE
Status: COMPLETED | OUTPATIENT
Start: 2025-01-08 | End: 2025-01-08

## 2025-01-08 RX ORDER — LIDOCAINE HYDROCHLORIDE 10 MG/ML
5 INJECTION, SOLUTION INFILTRATION; PERINEURAL
Status: COMPLETED | OUTPATIENT
Start: 2025-01-08 | End: 2025-01-08

## 2025-01-08 RX ADMIN — BETAMETHASONE ACETATE AND BETAMETHASONE SODIUM PHOSPHATE 2 ML: 3; 3 INJECTION, SUSPENSION INTRA-ARTICULAR; INTRALESIONAL; INTRAMUSCULAR; SOFT TISSUE at 10:26

## 2025-01-08 RX ADMIN — LIDOCAINE HYDROCHLORIDE 5 ML: 10 INJECTION, SOLUTION INFILTRATION; PERINEURAL at 10:26

## 2025-01-08 NOTE — PROGRESS NOTES
Note history of present: Patient with a history of left rotator cuff arthropathy    MRI was reviewed left cuff atrophy wasting massive tear retraction not unreconstructable secondary arthritic changes developing    We could not find any dedicated shoulder x-rays but on the chest x-ray you could see her bilateral shoulder from earlier in 2024 is globally arthritic elevated and up against the acromion with cuff arthropathy        Past medical history:    The patient's past medical history, family history, social history and review of systems were documented on the patient's medical intake form.  The medical intake form was reviewed and scanned into the electronic medical record for future use.  History is otherwise negative except as stated in the history of present illness.    Physical exam:    General: Alert and oriented to person place and time.  No acute distress and breathing comfortably, pleasant and cooperative with examination.    Head and neck exam: Head is normocephalic atraumatic.    Neck: Supple no visible swelling or deformity.    Cardiovascular: Good perfusion to affected extremities without signs or symptoms of chest pain.    Lungs no audible wheezing or labored breathing on examination.    Abdominal exam: Nondistended nontender    Extremities: The left shoulder was inspected and was found to have no obvious deformity.  There was tenderness to touch over the lateral edges of the shoulder over the rotator cuff insertion.  Active forward flexion 120 degrees, external rotation to 60 degrees, abduction to 50 degrees, and internal rotation to the level of L2.    At this time the shoulder is neurovascular tact and neurosensory intact.  Motor intact C5-T1.  There was no obvious neck pain or radiculopathy noted.  There was no gross instability or adhesive capsulitis symptoms.  There was no evidence of apprehension or apprehension suppression.    Strength was tested in 4 planes with weakness in the supraspinatus  strength testing and external rotation position.  There was no strength deficit in internal rotation.  Impingement signs were positive both supine and standing for impingement test type I and II.  There was mild pain over the bicipital groove with a positive speeds sign    Before aspiration injection the risks of a cortisone injection including infection, local skin irritation, skin atrophy, calcification, continued pain and discomfort, elevated blood sugar, burning, failure to relieve pain, possible late infection were discussed with the patient.    Postprocedure discomfort can be alleviated with additional medications, ice, elevation, rest over the first 24 hours as recommended.    Patient verbalized understanding and wanted to proceed with the planned procedure.    After informed consent was provided and allergies verified, the patient was positioned appropriately on thel bed.  The right shoulder to be aspirated and injected was prepped and draped in a sterile fashion.  The skin was anesthetized with ethyl chloride spray.  A joint aspiration was to be performed an 18-gauge needle was used otherwise a 22-gauge needle was used to inject the appropriate joint.    Joint injection was performed with a mixture of 5 cc 1% lidocaine plain and 2 cc Celestone Soluspan 6 mg per mL.  The needle was removed and the puncture site closed and sealed with a Band-Aid.  The patient tolerated the procedure well.        Diagnostic studies: We reviewed MRI and x-rays left shoulder consistent with cuff arthropathy nonrepairable rotator cuff      Impression: Rotator cuff arthropathy left shoulder      Plan: Someday she still has function with the arm to 90 degrees in flexion abduction today she is moving well but other days she has pseudoparalysis    Today we did an injection to give her temporary relief we will see her back in 4 to 6 weeks when she returns she will need AP axillary x-rays we would discuss shoulder arthroplasty  replacement if interested on the affected left shoulder  L Inj/Asp: L subacromial bursa on 1/8/2025 10:26 AM  Indications: pain  Details: 22 G needle, medial approach  Medications: 2 mL betamethasone acet,sod phos 6 mg/mL; 5 mL lidocaine 10 mg/mL (1 %)  Outcome: tolerated well, no immediate complications  Procedure, treatment alternatives, risks and benefits explained, specific risks discussed. Consent was given by the patient. Immediately prior to procedure a time out was called to verify the correct patient, procedure, equipment, support staff and site/side marked as required.

## 2025-01-09 ENCOUNTER — LAB (OUTPATIENT)
Dept: LAB | Facility: LAB | Age: 85
End: 2025-01-09
Payer: MEDICARE

## 2025-01-09 DIAGNOSIS — R30.0 DYSURIA: ICD-10-CM

## 2025-01-09 LAB
APPEARANCE UR: CLEAR
BILIRUB UR STRIP.AUTO-MCNC: NEGATIVE MG/DL
COLOR UR: YELLOW
GLUCOSE UR STRIP.AUTO-MCNC: NORMAL MG/DL
HYALINE CASTS #/AREA URNS AUTO: ABNORMAL /LPF
KETONES UR STRIP.AUTO-MCNC: NEGATIVE MG/DL
LEUKOCYTE ESTERASE UR QL STRIP.AUTO: NEGATIVE
MUCOUS THREADS #/AREA URNS AUTO: ABNORMAL /LPF
NITRITE UR QL STRIP.AUTO: NEGATIVE
PH UR STRIP.AUTO: 6 [PH]
PROT UR STRIP.AUTO-MCNC: ABNORMAL MG/DL
RBC # UR STRIP.AUTO: NEGATIVE /UL
RBC #/AREA URNS AUTO: ABNORMAL /HPF
SP GR UR STRIP.AUTO: 1.03
SQUAMOUS #/AREA URNS AUTO: ABNORMAL /HPF
UROBILINOGEN UR STRIP.AUTO-MCNC: ABNORMAL MG/DL
WBC #/AREA URNS AUTO: ABNORMAL /HPF

## 2025-01-09 PROCEDURE — 81001 URINALYSIS AUTO W/SCOPE: CPT

## 2025-01-09 PROCEDURE — 87086 URINE CULTURE/COLONY COUNT: CPT

## 2025-01-10 LAB — BACTERIA UR CULT: NORMAL

## 2025-01-13 ENCOUNTER — PREP FOR PROCEDURE (OUTPATIENT)
Dept: PREADMISSION TESTING | Facility: HOSPITAL | Age: 85
End: 2025-01-13

## 2025-01-13 ENCOUNTER — HOSPITAL ENCOUNTER (OUTPATIENT)
Dept: CARDIOLOGY | Facility: HOSPITAL | Age: 85
Discharge: HOME | End: 2025-01-13
Payer: MEDICARE

## 2025-01-13 ENCOUNTER — APPOINTMENT (OUTPATIENT)
Dept: UROLOGY | Facility: CLINIC | Age: 85
End: 2025-01-13
Payer: MEDICARE

## 2025-01-13 ENCOUNTER — HOSPITAL ENCOUNTER (OUTPATIENT)
Dept: RADIOLOGY | Facility: HOSPITAL | Age: 85
Discharge: HOME | End: 2025-01-13
Payer: MEDICARE

## 2025-01-13 ENCOUNTER — LAB (OUTPATIENT)
Dept: LAB | Facility: HOSPITAL | Age: 85
End: 2025-01-13
Payer: MEDICARE

## 2025-01-13 VITALS
DIASTOLIC BLOOD PRESSURE: 79 MMHG | WEIGHT: 150.57 LBS | HEIGHT: 62 IN | BODY MASS INDEX: 27.71 KG/M2 | HEART RATE: 71 BPM | SYSTOLIC BLOOD PRESSURE: 127 MMHG

## 2025-01-13 DIAGNOSIS — N13.2 HYDRONEPHROSIS WITH RENAL AND URETERAL CALCULUS OBSTRUCTION: ICD-10-CM

## 2025-01-13 DIAGNOSIS — N28.1 RENAL CYST: Primary | ICD-10-CM

## 2025-01-13 DIAGNOSIS — R10.9 FLANK PAIN: ICD-10-CM

## 2025-01-13 DIAGNOSIS — Z01.818 PRE-OP TESTING: ICD-10-CM

## 2025-01-13 DIAGNOSIS — N32.89 BLADDER INSTABILITY: ICD-10-CM

## 2025-01-13 DIAGNOSIS — R31.9 URINARY TRACT INFECTION WITH HEMATURIA, SITE UNSPECIFIED: ICD-10-CM

## 2025-01-13 DIAGNOSIS — R82.71 BACTERIA IN URINE: ICD-10-CM

## 2025-01-13 DIAGNOSIS — Z79.01 ANTICOAGULATED: ICD-10-CM

## 2025-01-13 DIAGNOSIS — N20.1 URETERAL CALCULUS: ICD-10-CM

## 2025-01-13 DIAGNOSIS — N20.0 NEPHROLITHIASIS: ICD-10-CM

## 2025-01-13 DIAGNOSIS — N20.0 NEPHROLITHIASIS: Primary | ICD-10-CM

## 2025-01-13 DIAGNOSIS — N39.0 URINARY TRACT INFECTION WITH HEMATURIA, SITE UNSPECIFIED: ICD-10-CM

## 2025-01-13 LAB
ABO GROUP (TYPE) IN BLOOD: NORMAL
ANION GAP SERPL CALC-SCNC: 10 MMOL/L (ref 10–20)
ANTIBODY SCREEN: NORMAL
APPEARANCE UR: CLEAR
BILIRUB UR STRIP.AUTO-MCNC: NEGATIVE MG/DL
BUN SERPL-MCNC: 20 MG/DL (ref 6–23)
CALCIUM SERPL-MCNC: 9.7 MG/DL (ref 8.6–10.3)
CHLORIDE SERPL-SCNC: 103 MMOL/L (ref 98–107)
CO2 SERPL-SCNC: 31 MMOL/L (ref 21–32)
COLOR UR: YELLOW
CREAT SERPL-MCNC: 0.6 MG/DL (ref 0.5–1.05)
EGFRCR SERPLBLD CKD-EPI 2021: 90 ML/MIN/1.73M*2
ERYTHROCYTE [DISTWIDTH] IN BLOOD BY AUTOMATED COUNT: 14.7 % (ref 11.5–14.5)
GLUCOSE SERPL-MCNC: 98 MG/DL (ref 74–99)
GLUCOSE UR STRIP.AUTO-MCNC: NORMAL MG/DL
HCT VFR BLD AUTO: 36.7 % (ref 36–46)
HGB BLD-MCNC: 11.5 G/DL (ref 12–16)
INR PPP: 1.1 (ref 0.9–1.1)
KETONES UR STRIP.AUTO-MCNC: NEGATIVE MG/DL
LEUKOCYTE ESTERASE UR QL STRIP.AUTO: NEGATIVE
MCH RBC QN AUTO: 27.1 PG (ref 26–34)
MCHC RBC AUTO-ENTMCNC: 31.3 G/DL (ref 32–36)
MCV RBC AUTO: 87 FL (ref 80–100)
NITRITE UR QL STRIP.AUTO: NEGATIVE
NRBC BLD-RTO: 0 /100 WBCS (ref 0–0)
PH UR STRIP.AUTO: 6 [PH]
PLATELET # BLD AUTO: 283 X10*3/UL (ref 150–450)
POTASSIUM SERPL-SCNC: 3.5 MMOL/L (ref 3.5–5.3)
PROT UR STRIP.AUTO-MCNC: NEGATIVE MG/DL
PROTHROMBIN TIME: 12.4 SECONDS (ref 9.8–12.8)
RBC # BLD AUTO: 4.24 X10*6/UL (ref 4–5.2)
RBC # UR STRIP.AUTO: NEGATIVE /UL
RH FACTOR (ANTIGEN D): NORMAL
SODIUM SERPL-SCNC: 140 MMOL/L (ref 136–145)
SP GR UR STRIP.AUTO: 1.02
UROBILINOGEN UR STRIP.AUTO-MCNC: ABNORMAL MG/DL
WBC # BLD AUTO: 8.3 X10*3/UL (ref 4.4–11.3)

## 2025-01-13 PROCEDURE — 86901 BLOOD TYPING SEROLOGIC RH(D): CPT

## 2025-01-13 PROCEDURE — 1126F AMNT PAIN NOTED NONE PRSNT: CPT | Performed by: UROLOGY

## 2025-01-13 PROCEDURE — 93005 ELECTROCARDIOGRAM TRACING: CPT

## 2025-01-13 PROCEDURE — 85027 COMPLETE CBC AUTOMATED: CPT

## 2025-01-13 PROCEDURE — 1160F RVW MEDS BY RX/DR IN RCRD: CPT | Performed by: UROLOGY

## 2025-01-13 PROCEDURE — 71046 X-RAY EXAM CHEST 2 VIEWS: CPT

## 2025-01-13 PROCEDURE — 87086 URINE CULTURE/COLONY COUNT: CPT | Mod: ELYLAB

## 2025-01-13 PROCEDURE — 36415 COLL VENOUS BLD VENIPUNCTURE: CPT

## 2025-01-13 PROCEDURE — 99203 OFFICE O/P NEW LOW 30 MIN: CPT | Performed by: UROLOGY

## 2025-01-13 PROCEDURE — 1036F TOBACCO NON-USER: CPT | Performed by: UROLOGY

## 2025-01-13 PROCEDURE — 74019 RADEX ABDOMEN 2 VIEWS: CPT

## 2025-01-13 PROCEDURE — 81003 URINALYSIS AUTO W/O SCOPE: CPT

## 2025-01-13 PROCEDURE — 82374 ASSAY BLOOD CARBON DIOXIDE: CPT

## 2025-01-13 PROCEDURE — 3074F SYST BP LT 130 MM HG: CPT | Performed by: UROLOGY

## 2025-01-13 PROCEDURE — 1159F MED LIST DOCD IN RCRD: CPT | Performed by: UROLOGY

## 2025-01-13 PROCEDURE — 3078F DIAST BP <80 MM HG: CPT | Performed by: UROLOGY

## 2025-01-13 PROCEDURE — 85610 PROTHROMBIN TIME: CPT

## 2025-01-13 RX ORDER — SODIUM CHLORIDE, SODIUM LACTATE, POTASSIUM CHLORIDE, CALCIUM CHLORIDE 600; 310; 30; 20 MG/100ML; MG/100ML; MG/100ML; MG/100ML
100 INJECTION, SOLUTION INTRAVENOUS ONCE
Status: CANCELLED | OUTPATIENT
Start: 2025-01-23

## 2025-01-13 RX ORDER — CIPROFLOXACIN 2 MG/ML
400 INJECTION, SOLUTION INTRAVENOUS ONCE
Status: CANCELLED | OUTPATIENT
Start: 2025-01-23 | End: 2025-01-23

## 2025-01-13 RX ORDER — SODIUM CHLORIDE, SODIUM LACTATE, POTASSIUM CHLORIDE, CALCIUM CHLORIDE 600; 310; 30; 20 MG/100ML; MG/100ML; MG/100ML; MG/100ML
100 INJECTION, SOLUTION INTRAVENOUS ONCE
Status: CANCELLED | OUTPATIENT
Start: 2025-01-23 | End: 2025-01-23

## 2025-01-13 RX ORDER — CIPROFLOXACIN 2 MG/ML
400 INJECTION, SOLUTION INTRAVENOUS ONCE
Status: CANCELLED | OUTPATIENT
Start: 2025-01-23

## 2025-01-13 ASSESSMENT — PAIN SCALES - GENERAL: PAINLEVEL_OUTOF10: 0-NO PAIN

## 2025-01-13 ASSESSMENT — ENCOUNTER SYMPTOMS
DYSURIA: 0
DIFFICULTY URINATING: 0
FLANK PAIN: 1
HEMATURIA: 0
FREQUENCY: 1

## 2025-01-13 NOTE — PATIENT INSTRUCTIONS
It was very nice to meet you and your daughter Becky today.  We had a full discussion regarding stone disease.  I personally reviewed your CAT scan images with you and your daughter on my computer monitor in the examination room.  You were able to identify the 5 mm stone in the right kidney.  Your  had serious issues with stones and you do not wish to have an obstructing stone as he did on multiple occasions.  Therefore you would like to proceed with shockwave lithotripsy and placement of a ureteral stent.  You have chosen January 23, 2025 for that procedure.  Dr. Salomon will provide preoperative cardiac risk stratification.  
98.7

## 2025-01-13 NOTE — PROGRESS NOTES
Patient denies any pain today. Patient has not had any recent surgeries or hospital admits. Patient denies any concerns about falling or safety. Patient has occasional frequency and double voiding Patient was aware she has bilateral cyst and a weak bladder. CV    Review of Systems   Genitourinary:  Positive for decreased urine volume, flank pain, frequency and urgency. Negative for difficulty urinating, dysuria, enuresis and hematuria.   All other systems reviewed and are negative.

## 2025-01-13 NOTE — PROGRESS NOTES
"84-year-old black female referred by [facility] emergency room for \"long back pain, bilateral renal cysts, bilateral nonobstructing nephrolithiasis\".  Renal colic CAT scan does show bilateral renal cysts and punctate right renal stones and a 5 mm right lower pole stone.  Urinalysis is negative for blood and creatinine is 0.62.  Patient is retired.  No family history of breast or prostate cancer.  She has a 4-pack-year cigarette smoking history.    January 13, 2025, patient arrives with her daughter Becky.  We had a full discussion regarding stone disease.  I personally reviewed her CAT scan images with the patient and her daughter on my computer monitor in the examination room.  She was able to identify the 5 mm right lower pole renal calculus.  Her  had suffered from stone disease and she is very concerned about migration of the stone and obstruction and would like to proceed with shockwave lithotripsy and placement of a 6 Nigerien by 20 cm double-J ureteral stent.  She would receive cardiac clearance from Dr. Pranay Salomon, her cardiologist.  We also discussed her double voiding and incontinence.  She does wear 2 pads per day and will consider evaluation of that problem at a later date.  Urine culture was initially contaminated.  It was repeated and showed no growth.  Urine cytology was negative for malignant cells.    PLAN:    1.  The patient will be scheduled for a cystoscopy, right retropyelogram, placement of a right 6 Nigerien by 20 cm double-J ureteral stent and ESWL of her right 5 mm lower pole renal calculus on January 23, 2025.    2.  Dr. Pranay Salomon will provide preoperative cardiac risk stratification.      "

## 2025-01-13 NOTE — LETTER
"January 13, 2025     Pilo Heath MD  [address]  [place] [address]    Patient: Nicolas Mcleod   YOB: 1940   Date of Visit: 1/13/2025       Dear Dr. Pilo Heath MD:    Thank you for referring Nicolas Mcleod to me for evaluation. Below are my notes for this consultation.  If you have questions, please do not hesitate to call me. I look forward to following your patient along with you.       Sincerely,     Ramiro Duong MD      CC: No Recipients  ______________________________________________________________________________________    84-year-old black female referred by [facility] emergency room for \"long back pain, bilateral renal cysts, bilateral nonobstructing nephrolithiasis\".  Renal colic CAT scan does show bilateral renal cysts and punctate right renal stones and a 5 mm right lower pole stone.  Urinalysis is negative for blood and creatinine is 0.62.  Patient is retired.  No family history of breast or prostate cancer.  She has a 4-pack-year cigarette smoking history.    January 13, 2025, patient arrives with her daughter Becky.  We had a full discussion regarding stone disease.  I personally reviewed her CAT scan images with the patient and her daughter on my computer monitor in the examination room.  She was able to identify the 5 mm right lower pole renal calculus.  Her  had suffered from stone disease and she is very concerned about migration of the stone and obstruction and would like to proceed with shockwave lithotripsy and placement of a 6 Turkmen by 20 cm double-J ureteral stent.  She would receive cardiac clearance from Dr. Pranay Salomon, her cardiologist.  We also discussed her double voiding and incontinence.  She does wear 2 pads per day and will consider evaluation of that problem at a later date.  Urine culture was initially contaminated.  It was repeated and showed no growth.  Urine cytology was negative for malignant cells.    PLAN:    1.  The " patient will be scheduled for a cystoscopy, right retropyelogram, placement of a right 6 Luxembourger by 20 cm double-J ureteral stent and ESWL of her right 5 mm lower pole renal calculus on January 23, 2025.    2.  Dr. Pranay Salomon will provide preoperative cardiac risk stratification.

## 2025-01-14 LAB — BACTERIA UR CULT: ABNORMAL

## 2025-01-14 RX ORDER — LOSARTAN POTASSIUM AND HYDROCHLOROTHIAZIDE 25; 100 MG/1; MG/1
1 TABLET ORAL DAILY
COMMUNITY
Start: 2024-09-30

## 2025-01-14 NOTE — PREPROCEDURE INSTRUCTIONS

## 2025-01-15 DIAGNOSIS — N39.0 URINARY TRACT INFECTION WITHOUT HEMATURIA, SITE UNSPECIFIED: ICD-10-CM

## 2025-01-15 RX ORDER — NITROFURANTOIN 25; 75 MG/1; MG/1
100 CAPSULE ORAL EVERY 12 HOURS
Qty: 10 CAPSULE | Refills: 0 | Status: SHIPPED | OUTPATIENT
Start: 2025-01-15 | End: 2025-01-23

## 2025-01-16 ENCOUNTER — OFFICE VISIT (OUTPATIENT)
Dept: CARDIOLOGY | Facility: CLINIC | Age: 85
End: 2025-01-16
Payer: MEDICARE

## 2025-01-16 VITALS
SYSTOLIC BLOOD PRESSURE: 120 MMHG | BODY MASS INDEX: 27.7 KG/M2 | HEIGHT: 62 IN | DIASTOLIC BLOOD PRESSURE: 66 MMHG | HEART RATE: 63 BPM | WEIGHT: 150.5 LBS

## 2025-01-16 DIAGNOSIS — Z01.818 PREOPERATIVE CLEARANCE: Primary | ICD-10-CM

## 2025-01-16 DIAGNOSIS — Z01.810 PREOP CARDIOVASCULAR EXAM: ICD-10-CM

## 2025-01-16 PROCEDURE — 1159F MED LIST DOCD IN RCRD: CPT | Performed by: NURSE PRACTITIONER

## 2025-01-16 PROCEDURE — 3074F SYST BP LT 130 MM HG: CPT | Performed by: NURSE PRACTITIONER

## 2025-01-16 PROCEDURE — 3078F DIAST BP <80 MM HG: CPT | Performed by: NURSE PRACTITIONER

## 2025-01-16 PROCEDURE — 99214 OFFICE O/P EST MOD 30 MIN: CPT | Performed by: NURSE PRACTITIONER

## 2025-01-16 PROCEDURE — 1036F TOBACCO NON-USER: CPT | Performed by: NURSE PRACTITIONER

## 2025-01-16 ASSESSMENT — ENCOUNTER SYMPTOMS
COLOR CHANGE: 0
SUSPICIOUS LESIONS: 0
ALLERGIC/IMMUNOLOGIC NEGATIVE: 1
HEMATOLOGIC/LYMPHATIC NEGATIVE: 1
FEVER: 0
PND: 0
DYSPNEA ON EXERTION: 0
NUMBNESS: 0
PALPITATIONS: 0
DIAPHORESIS: 0
VOMITING: 0
DIARRHEA: 0
PSYCHIATRIC NEGATIVE: 1
ENDOCRINE NEGATIVE: 1
CONSTIPATION: 0
NAUSEA: 0
SPUTUM PRODUCTION: 0
NEAR-SYNCOPE: 0
ORTHOPNEA: 0
COUGH: 0
CONSTITUTIONAL NEGATIVE: 1
CHILLS: 0
CLAUDICATION: 0
SYNCOPE: 0
POOR WOUND HEALING: 0
WHEEZING: 0
EYES NEGATIVE: 1
HEMOPTYSIS: 0
ABDOMINAL PAIN: 0
DIZZINESS: 0
NEUROLOGICAL NEGATIVE: 1
NAIL CHANGES: 0
UNUSUAL HAIR DISTRIBUTION: 0
IRREGULAR HEARTBEAT: 0
MUSCULOSKELETAL NEGATIVE: 1
DECREASED APPETITE: 0
LIGHT-HEADEDNESS: 0
VISUAL HALOS: 0
SHORTNESS OF BREATH: 0

## 2025-01-16 NOTE — PROGRESS NOTES
CARDIOLOGY OFFICE VISIT      CHIEF COMPLAINT  Chief Complaint   Patient presents with    Pre-op Clearance     For kidney stone surgery with Dr. Duong on 1/23/2025       HISTORY OF PRESENT ILLNESS  84-year-old female comes into the office for preop clearance.  She states that she has a kidney stone she went and seen her urologist and they would like preop clearance prior to the procedure.  She states she is not having any chest pain, shortness of breath, nausea, vomiting, PND, orthopnea, claudications.  She states she has been in really good health kidney stone has been bothering her little bit and she does want to have it removed    RECENT TESTING  8/22/2024 echo  CONCLUSIONS:   1. Left ventricular ejection fraction is normal, by visual estimate at 60-65%.   2. Spectral Doppler shows an impaired relaxation pattern of left ventricular diastolic filling.   3. There is moderate concentric left ventricular hypertrophy.   4. There is a moderate left ventricular outflow tract obstruction with the Valsalva maneuver.   5. There is normal right ventricular global systolic function.   6. Trace mitral valve regurgitation.   7. Moderate tricuspid regurgitation.   8. Aortic valve stenosis is not present.   9. Mild to moderate pulmonic valve regurgitation.  10. The main pulmonary artery is normal in size, and position, with normal bifurcation into the left and right pulmonary arteries.    Past Medical History  Past Medical History:   Diagnosis Date    Arthritis     Autoimmune disorder (Multi)     Clotting disorder (Multi)     Fractures     ribs    H/O deep venous thrombosis     HL (hearing loss)     Hyperlipidemia     Hypertension     Joint pain     Nephrolithiasis     Reactive airway disease (HHS-HCC)     Rheumatoid arthritis     Wears dentures     Wears glasses        Social History  Social History     Tobacco Use    Smoking status: Former     Average packs/day: 0.3 packs/day for 21.0 years (5.3 ttl pk-yrs)     Types:  Cigarettes     Start date: 1961    Smokeless tobacco: Never   Vaping Use    Vaping status: Never Used   Substance Use Topics    Alcohol use: Yes     Comment: 4-5x a year    Drug use: Never       Family History     Family History   Problem Relation Name Age of Onset    Other (htn) Mother      Cancer Mother's Sister          Allergies:  Allergies   Allergen Reactions    Shellfish Containing Products Unknown    Statins-Hmg-Coa Reductase Inhibitors Unknown        Outpatient Medications:  Current Outpatient Medications   Medication Instructions    amLODIPine (Norvasc) 10 mg tablet 1 tablet, Daily    amLODIPine (NORVASC) 5 mg, oral, 2 times daily    aspirin 81 mg chewable tablet 2 tablets, Daily    CALCIUM CITRATE-VITAMIN D3 ORAL Take by mouth.  TAKE AS DIRECTED PER PACKAGE INSTRUCTIONS.    cholecalciferol (Vitamin D-3) 50 mcg (2,000 unit) capsule 1 capsule, 2 times weekly    dextromethorphan-guaifenesin (Robitussin DM)  mg/5 mL oral liquid 5 mL, oral, Every 4 hours PRN    folic acid (Folvite) 1 mg tablet 1 tablet, Once Weekly    inFLIXimab (Remicade) 100 mg injection Infuse into a venous catheter.   USE AS DIRECTED.    ipratropium-albuteroL (Duo-Neb) 0.5-2.5 mg/3 mL nebulizer solution 3 mL, nebulization, Every 2 hour PRN    lidocaine (Lidoderm) 5 % patch 1 patch, transdermal, Daily, Remove & discard patch within 12 hours or as directed by MD.    losartan-hydrochlorothiazide (Hyzaar) 100-25 mg tablet 1 tablet, Daily    methotrexate (TREXALL) 12.5 mg, oral, Once Weekly    metoprolol succinate XL (TOPROL-XL) 100 mg, Daily    nitrofurantoin, macrocrystal-monohydrate, (Macrobid) 100 mg capsule 100 mg, oral, Every 12 hours    potassium chloride CR 10 mEq ER tablet 10 mEq, Daily    predniSONE (DELTASONE) 5 mg, Daily    tiZANidine (ZANAFLEX) 2 mg, oral, Every 6 hours PRN          VITALS  Vitals:    01/16/25 1222   BP: 120/66   Pulse: 63       REVIEW OF SYSTEMS:  Review of Systems   Constitutional: Negative. Negative for  chills, decreased appetite, diaphoresis, fever and malaise/fatigue.   HENT: Negative.     Eyes: Negative.  Negative for visual disturbance and visual halos.   Cardiovascular:  Negative for chest pain, claudication, cyanosis, dyspnea on exertion, irregular heartbeat, leg swelling, near-syncope, orthopnea, palpitations, paroxysmal nocturnal dyspnea and syncope.   Respiratory:  Negative for cough, hemoptysis, shortness of breath, sputum production and wheezing.    Endocrine: Negative.    Hematologic/Lymphatic: Negative.    Skin:  Negative for color change, dry skin, flushing, itching, nail changes, poor wound healing, rash, skin cancer, suspicious lesions and unusual hair distribution.   Musculoskeletal: Negative.    Gastrointestinal:  Negative for abdominal pain, constipation, diarrhea, nausea and vomiting.   Genitourinary: Negative.    Neurological: Negative.  Negative for dizziness, light-headedness and numbness.   Psychiatric/Behavioral: Negative.     Allergic/Immunologic: Negative.         PHYSICAL EXAM  General: NAD, well-appearing  HEENT: moist mucous membranes, no jaundice  Neck: No JVD, no carotid bruit  Lungs: CTA tony, no wheezing or rales  Cardiac: RRR, no murmurs  Abdomen: soft, non-tender, non-distended  Extremities: 2+ radial pulses, no edema, palpable pulses  Skin: warm, dry, no wounds noted  Neurologic: AAOx3,  no focal deficits      ASSESSMENT AND PLAN  Diagnoses and all orders for this visit:  Preoperative clearance  Preop cardiovascular exam    Patient is low risk of developing perioperative cardiovascular event.  I explained this at length with patient okay to proceed with procedure          Phoenix Cordero CNP   [facility]      [unfilled]    **Disclaimer: This note was dictated by speech recognition, and every effort has been made to prevent any error in transcription, however minor errors may be present**

## 2025-01-16 NOTE — H&P (VIEW-ONLY)
CARDIOLOGY OFFICE VISIT      CHIEF COMPLAINT  Chief Complaint   Patient presents with    Pre-op Clearance     For kidney stone surgery with Dr. Duong on 1/23/2025       HISTORY OF PRESENT ILLNESS  84-year-old female comes into the office for preop clearance.  She states that she has a kidney stone she went and seen her urologist and they would like preop clearance prior to the procedure.  She states she is not having any chest pain, shortness of breath, nausea, vomiting, PND, orthopnea, claudications.  She states she has been in really good health kidney stone has been bothering her little bit and she does want to have it removed    RECENT TESTING  8/22/2024 echo  CONCLUSIONS:   1. Left ventricular ejection fraction is normal, by visual estimate at 60-65%.   2. Spectral Doppler shows an impaired relaxation pattern of left ventricular diastolic filling.   3. There is moderate concentric left ventricular hypertrophy.   4. There is a moderate left ventricular outflow tract obstruction with the Valsalva maneuver.   5. There is normal right ventricular global systolic function.   6. Trace mitral valve regurgitation.   7. Moderate tricuspid regurgitation.   8. Aortic valve stenosis is not present.   9. Mild to moderate pulmonic valve regurgitation.  10. The main pulmonary artery is normal in size, and position, with normal bifurcation into the left and right pulmonary arteries.    Past Medical History  Past Medical History:   Diagnosis Date    Arthritis     Autoimmune disorder (Multi)     Clotting disorder (Multi)     Fractures     ribs    H/O deep venous thrombosis     HL (hearing loss)     Hyperlipidemia     Hypertension     Joint pain     Nephrolithiasis     Reactive airway disease (HHS-HCC)     Rheumatoid arthritis     Wears dentures     Wears glasses        Social History  Social History     Tobacco Use    Smoking status: Former     Average packs/day: 0.3 packs/day for 21.0 years (5.3 ttl pk-yrs)     Types:  Cigarettes     Start date: 1961    Smokeless tobacco: Never   Vaping Use    Vaping status: Never Used   Substance Use Topics    Alcohol use: Yes     Comment: 4-5x a year    Drug use: Never       Family History     Family History   Problem Relation Name Age of Onset    Other (htn) Mother      Cancer Mother's Sister          Allergies:  Allergies   Allergen Reactions    Shellfish Containing Products Unknown    Statins-Hmg-Coa Reductase Inhibitors Unknown        Outpatient Medications:  Current Outpatient Medications   Medication Instructions    amLODIPine (Norvasc) 10 mg tablet 1 tablet, Daily    amLODIPine (NORVASC) 5 mg, oral, 2 times daily    aspirin 81 mg chewable tablet 2 tablets, Daily    CALCIUM CITRATE-VITAMIN D3 ORAL Take by mouth.  TAKE AS DIRECTED PER PACKAGE INSTRUCTIONS.    cholecalciferol (Vitamin D-3) 50 mcg (2,000 unit) capsule 1 capsule, 2 times weekly    dextromethorphan-guaifenesin (Robitussin DM)  mg/5 mL oral liquid 5 mL, oral, Every 4 hours PRN    folic acid (Folvite) 1 mg tablet 1 tablet, Once Weekly    inFLIXimab (Remicade) 100 mg injection Infuse into a venous catheter.   USE AS DIRECTED.    ipratropium-albuteroL (Duo-Neb) 0.5-2.5 mg/3 mL nebulizer solution 3 mL, nebulization, Every 2 hour PRN    lidocaine (Lidoderm) 5 % patch 1 patch, transdermal, Daily, Remove & discard patch within 12 hours or as directed by MD.    losartan-hydrochlorothiazide (Hyzaar) 100-25 mg tablet 1 tablet, Daily    methotrexate (TREXALL) 12.5 mg, oral, Once Weekly    metoprolol succinate XL (TOPROL-XL) 100 mg, Daily    nitrofurantoin, macrocrystal-monohydrate, (Macrobid) 100 mg capsule 100 mg, oral, Every 12 hours    potassium chloride CR 10 mEq ER tablet 10 mEq, Daily    predniSONE (DELTASONE) 5 mg, Daily    tiZANidine (ZANAFLEX) 2 mg, oral, Every 6 hours PRN          VITALS  Vitals:    01/16/25 1222   BP: 120/66   Pulse: 63       REVIEW OF SYSTEMS:  Review of Systems   Constitutional: Negative. Negative for  chills, decreased appetite, diaphoresis, fever and malaise/fatigue.   HENT: Negative.     Eyes: Negative.  Negative for visual disturbance and visual halos.   Cardiovascular:  Negative for chest pain, claudication, cyanosis, dyspnea on exertion, irregular heartbeat, leg swelling, near-syncope, orthopnea, palpitations, paroxysmal nocturnal dyspnea and syncope.   Respiratory:  Negative for cough, hemoptysis, shortness of breath, sputum production and wheezing.    Endocrine: Negative.    Hematologic/Lymphatic: Negative.    Skin:  Negative for color change, dry skin, flushing, itching, nail changes, poor wound healing, rash, skin cancer, suspicious lesions and unusual hair distribution.   Musculoskeletal: Negative.    Gastrointestinal:  Negative for abdominal pain, constipation, diarrhea, nausea and vomiting.   Genitourinary: Negative.    Neurological: Negative.  Negative for dizziness, light-headedness and numbness.   Psychiatric/Behavioral: Negative.     Allergic/Immunologic: Negative.         PHYSICAL EXAM  General: NAD, well-appearing  HEENT: moist mucous membranes, no jaundice  Neck: No JVD, no carotid bruit  Lungs: CTA tony, no wheezing or rales  Cardiac: RRR, no murmurs  Abdomen: soft, non-tender, non-distended  Extremities: 2+ radial pulses, no edema, palpable pulses  Skin: warm, dry, no wounds noted  Neurologic: AAOx3,  no focal deficits      ASSESSMENT AND PLAN  Diagnoses and all orders for this visit:  Preoperative clearance  Preop cardiovascular exam    Patient is low risk of developing perioperative cardiovascular event.  I explained this at length with patient okay to proceed with procedure          Phoenix Cordero CNP   [facility]      [unfilled]    **Disclaimer: This note was dictated by speech recognition, and every effort has been made to prevent any error in transcription, however minor errors may be present**

## 2025-01-21 NOTE — H&P
History Of Present Illness  Nicolas Mcleod is a 84 y.o. female presenting with .     Past Medical History  She has a past medical history of Arthritis, Autoimmune disorder (Multi), Clotting disorder (Multi), Fractures, H/O deep venous thrombosis, HL (hearing loss), Hyperlipidemia, Hypertension, Joint pain, Nephrolithiasis, Reactive airway disease (HHS-HCC), Rheumatoid arthritis, Wears dentures, and Wears glasses.    Surgical History  She has a past surgical history that includes Hysterectomy; Skin lesion excision (Left); Tubal ligation; Breast biopsy; Thyroid surgery; Joint replacement; Knee Arthroplasty (Bilateral); Eye surgery; Cataract extraction; Sinus surgery; and Colonoscopy.     Social History  She reports that she has quit smoking. Her smoking use included cigarettes. She started smoking about 64 years ago. She has a 5.3 pack-year smoking history. She has never used smokeless tobacco. She reports current alcohol use. She reports that she does not use drugs.    Family History  Family History   Problem Relation Name Age of Onset    Other (htn) Mother      Cancer Mother's Sister          Allergies  Shellfish containing products and Statins-hmg-coa reductase inhibitors    Review of Systems     Physical Exam     Last Recorded Vitals  There were no vitals taken for this visit.    Relevant Results    No results found for this or any previous visit (from the past 24 hours).    ECG 12 lead (Clinic Performed)    Result Date: 1/16/2025  Normal sinus rhythm left bundle branch block    XR abdomen 2 views supine and erect or decub    Result Date: 1/15/2025  Interpreted By:  Tyron Cifuentes, STUDY: XR ABDOMEN 2 VIEWS SUPINE AND ERECT OR DECUB;  1/13/2025 1:44 pm   INDICATION: Signs/Symptoms:pre-op testing. ,Z01.818 Encounter for other preprocedural examination   COMPARISON: Correlation with CT scan from 10/10/2024.   ACCESSION NUMBER(S): [id number]   ORDERING CLINICIAN: SARAH TRAN   TECHNIQUE: A single supine view  and a single upright view of the abdomen and pelvis were obtained.   FINDINGS: There was   mild-to-moderate scattered retained colonic stool and gas.. There was no abnormal small bowel dilatation. No abnormal air-fluid level or pneumoperitoneum on the upright view. There was no gross organomegaly. Aortoiliac calcifications. Rounded calcification overlying the lower left pelvis corresponds to a calcification in the lower left anterior pelvic wall on prior CT scan. There is a 3 mm right renal stone. No calcification overlying the left renal fossa. No destructive bone lesion.   Mild proximal lumbar dextroscoliosis. Mild-to-moderate multilevel dorsal spine disc space narrowing with endplate osteophytosis. Prominent inter facet hypertrophy with spurring at the lumbosacral junction. Sclerotic arthritic changes in both SI joints. Multiple calcified granulomas at the lung bases, unchanged from prior chest x-rays..       Grossly stable right renal stone.   Scoliosis and DJD in the dorsal spine as described.   Stable calcified granulomas at the lung bases.   Mild-to-moderate retained colonic stool.   MACRO: None   Signed by: Tyron Cifuentes 1/15/2025 9:38 AM Dictation workstation:   [id number]    XR chest 2 views    Result Date: 1/15/2025  Interpreted By:  Tyron Cifuentes, STUDY: XR CHEST 2 VIEWS;  1/13/2025 1:44 pm   INDICATION: Signs/Symptoms:pre op testing. ,Z01.818 Encounter for other preprocedural examination   COMPARISON: CT scan abdomen and pelvis dated 10/10/2024.   ACCESSION NUMBER(S): [id number]   ORDERING CLINICIAN: SARAH TRAN   TECHNIQUE: PA and lateral views of the chest were obtained.   FINDINGS: MEDIASTINUM/LUNGS/ROSY: No cardiomegaly, vascular congestion, or pleural effusion. Calcification present in the aorta. Multiple stable scattered bilateral lung calcified granulomas. No abnormal opacity in either lung worrisome for tumor or pneumonia. No pneumothorax. No tracheal deviation. No abnormal hilar  fullness or gross mass on either side.   BONES: No lytic or blastic destructive bone lesion.  There is mild-to-moderate disc space narrowing and endplate osteophytosis throughout the thoracic spine. Old posterior left 4th through 7th rib fracture deformities.   UPPER ABDOMEN: Grossly intact.       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   Several old left rib fracture deformities as described.   Multiple stable bilateral lung calcified granulomas.   MACRO: None   Signed by: Tyron Cifuentes 1/15/2025 9:35 AM Dictation workstation:   [id number]    MR shoulder left wo IV contrast    Result Date: 12/30/2024  Interpreted By:  Josemanuel Verduzco and Stephens Katherine STUDY: MRI of the  left shoulder without IV contrast;  12/30/2024 11:26 am   INDICATION: Signs/Symptoms:ROTATOR CUFF TEAR.   ,M75.102 Unspecified rotator cuff tear or rupture of left shoulder, not specified as traumatic   COMPARISON: None   ACCESSION NUMBER(S): [id number]   ORDERING CLINICIAN: PEEWEE WINTERS   TECHNIQUE: MR imaging of the  left shoulder was obtained  without IV contrast.   FINDINGS: Evaluation markedly limited by motion.   ROTATOR CUFF TENDONS: Large full-thickness retracted tear of the entire supraspinatus and anterior infraspinatus tendons with associated atrophy. The subscapularis and teres minor tendons are intact.   BICEPS TENDON AND ROTATOR INTERVAL: The intra-articular long head biceps tendon is intact and has a normal course. The rotator interval is unremarkable.   JOINTS: Mild articular cartilage loss and osteophyte formation of the acromioclavicular joint.   Moderate glenohumeral joint articular cartilage loss. The humeral head is high-riding.   Small glenohumeral joint effusion. Small volume fluid in the subacromial subdeltoid bursa.   LABRUM: Circumferential degenerative tearing of the glenoid labrum.   OSSEOUS STRUCTURES: No focal marrow replacing lesions are identified. There is no fracture.   SOFT TISSUES: The suprascapular nerve  is intact at the suprascapular and spinoglenoid notches.       Markedly limited study secondary to motion. 1. Large full-thickness tear of the entire supraspinatus and anterior infraspinatus tendons with associated muscle atrophy and high-riding humeral head consistent with chronic tear. 2. Mild acromioclavicular and moderate glenohumeral articular cartilage loss. 3. Mild subacromial subdeltoid bursitis. 4. Small glenohumeral joint effusion. 5. Degenerative tearing of the glenoid labrum.     I personally reviewed the images/study and I agree with Christal Rasmussen DO's (radiology resident) findings as stated. This study was interpreted at [facility], [place], [place].   MACRO: None   Signed by: Josemanuel Verduzco 12/30/2024 12:09 PM Dictation workstation:   [id number]           Assessment/Plan              I spent  minutes in the professional and overall care of this patient.      Ramiro Duong MD

## 2025-01-23 ENCOUNTER — HOSPITAL ENCOUNTER (OUTPATIENT)
Facility: HOSPITAL | Age: 85
Setting detail: OUTPATIENT SURGERY
Discharge: HOME | End: 2025-01-23
Attending: UROLOGY | Admitting: UROLOGY
Payer: MEDICARE

## 2025-01-23 ENCOUNTER — ANESTHESIA (OUTPATIENT)
Dept: OPERATING ROOM | Facility: HOSPITAL | Age: 85
End: 2025-01-23
Payer: MEDICARE

## 2025-01-23 ENCOUNTER — ANESTHESIA EVENT (OUTPATIENT)
Dept: OPERATING ROOM | Facility: HOSPITAL | Age: 85
End: 2025-01-23
Payer: MEDICARE

## 2025-01-23 VITALS
TEMPERATURE: 98.4 F | HEIGHT: 62 IN | DIASTOLIC BLOOD PRESSURE: 60 MMHG | HEART RATE: 60 BPM | BODY MASS INDEX: 27.26 KG/M2 | RESPIRATION RATE: 17 BRPM | SYSTOLIC BLOOD PRESSURE: 130 MMHG | WEIGHT: 148.15 LBS | OXYGEN SATURATION: 96 %

## 2025-01-23 DIAGNOSIS — N20.1 URETERAL CALCULUS: ICD-10-CM

## 2025-01-23 DIAGNOSIS — Z01.818 PRE-OP TESTING: ICD-10-CM

## 2025-01-23 DIAGNOSIS — N13.2 HYDRONEPHROSIS WITH RENAL AND URETERAL CALCULUS OBSTRUCTION: ICD-10-CM

## 2025-01-23 DIAGNOSIS — N32.89 BLADDER INSTABILITY: ICD-10-CM

## 2025-01-23 DIAGNOSIS — N39.0 URINARY TRACT INFECTION WITH HEMATURIA, SITE UNSPECIFIED: ICD-10-CM

## 2025-01-23 DIAGNOSIS — N20.0 NEPHROLITHIASIS: Primary | ICD-10-CM

## 2025-01-23 DIAGNOSIS — R31.9 URINARY TRACT INFECTION WITH HEMATURIA, SITE UNSPECIFIED: ICD-10-CM

## 2025-01-23 DIAGNOSIS — R10.9 FLANK PAIN: ICD-10-CM

## 2025-01-23 LAB
ABO GROUP (TYPE) IN BLOOD: NORMAL
RH FACTOR (ANTIGEN D): NORMAL

## 2025-01-23 PROCEDURE — 3700000001 HC GENERAL ANESTHESIA TIME - INITIAL BASE CHARGE: Performed by: UROLOGY

## 2025-01-23 PROCEDURE — 2720000007 HC OR 272 NO HCPCS: Performed by: UROLOGY

## 2025-01-23 PROCEDURE — 7100000001 HC RECOVERY ROOM TIME - INITIAL BASE CHARGE: Performed by: UROLOGY

## 2025-01-23 PROCEDURE — 2550000001 HC RX 255 CONTRASTS: Performed by: UROLOGY

## 2025-01-23 PROCEDURE — 7100000002 HC RECOVERY ROOM TIME - EACH INCREMENTAL 1 MINUTE: Performed by: UROLOGY

## 2025-01-23 PROCEDURE — 3700000002 HC GENERAL ANESTHESIA TIME - EACH INCREMENTAL 1 MINUTE: Performed by: UROLOGY

## 2025-01-23 PROCEDURE — 2500000005 HC RX 250 GENERAL PHARMACY W/O HCPCS: Performed by: UROLOGY

## 2025-01-23 PROCEDURE — 36415 COLL VENOUS BLD VENIPUNCTURE: CPT | Performed by: UROLOGY

## 2025-01-23 PROCEDURE — 3600000003 HC OR TIME - INITIAL BASE CHARGE - PROCEDURE LEVEL THREE: Performed by: UROLOGY

## 2025-01-23 PROCEDURE — 2780000003 HC OR 278 NO HCPCS: Performed by: UROLOGY

## 2025-01-23 PROCEDURE — 2500000004 HC RX 250 GENERAL PHARMACY W/ HCPCS (ALT 636 FOR OP/ED)

## 2025-01-23 PROCEDURE — 2500000004 HC RX 250 GENERAL PHARMACY W/ HCPCS (ALT 636 FOR OP/ED): Performed by: UROLOGY

## 2025-01-23 PROCEDURE — 2500000001 HC RX 250 WO HCPCS SELF ADMINISTERED DRUGS (ALT 637 FOR MEDICARE OP): Performed by: UROLOGY

## 2025-01-23 PROCEDURE — 52332 CYSTOSCOPY AND TREATMENT: CPT | Performed by: UROLOGY

## 2025-01-23 PROCEDURE — C1769 GUIDE WIRE: HCPCS | Performed by: UROLOGY

## 2025-01-23 PROCEDURE — 7100000010 HC PHASE TWO TIME - EACH INCREMENTAL 1 MINUTE: Performed by: UROLOGY

## 2025-01-23 PROCEDURE — 7100000009 HC PHASE TWO TIME - INITIAL BASE CHARGE: Performed by: UROLOGY

## 2025-01-23 PROCEDURE — 3600000008 HC OR TIME - EACH INCREMENTAL 1 MINUTE - PROCEDURE LEVEL THREE: Performed by: UROLOGY

## 2025-01-23 PROCEDURE — 50590 FRAGMENTING OF KIDNEY STONE: CPT | Performed by: UROLOGY

## 2025-01-23 PROCEDURE — C2617 STENT, NON-COR, TEM W/O DEL: HCPCS | Performed by: UROLOGY

## 2025-01-23 DEVICE — INLAY OPTIMA URETERAL STENT W/O GUIDEWIRE
Type: IMPLANTABLE DEVICE | Status: NON-FUNCTIONAL
Brand: BARD® INLAY OPTIMA® URETERAL STENT

## 2025-01-23 DEVICE — INLAY OPTIMA URETERAL STENT W/O GUIDEWIRE
Type: IMPLANTABLE DEVICE | Site: URETER | Status: FUNCTIONAL
Brand: BARD® INLAY OPTIMA® URETERAL STENT

## 2025-01-23 RX ORDER — METOPROLOL TARTRATE 1 MG/ML
5 INJECTION, SOLUTION INTRAVENOUS ONCE
Status: DISCONTINUED | OUTPATIENT
Start: 2025-01-23 | End: 2025-01-23 | Stop reason: HOSPADM

## 2025-01-23 RX ORDER — ONDANSETRON HYDROCHLORIDE 2 MG/ML
INJECTION, SOLUTION INTRAVENOUS AS NEEDED
Status: DISCONTINUED | OUTPATIENT
Start: 2025-01-23 | End: 2025-01-23

## 2025-01-23 RX ORDER — SODIUM CHLORIDE, SODIUM LACTATE, POTASSIUM CHLORIDE, CALCIUM CHLORIDE 600; 310; 30; 20 MG/100ML; MG/100ML; MG/100ML; MG/100ML
50 INJECTION, SOLUTION INTRAVENOUS CONTINUOUS
Status: DISCONTINUED | OUTPATIENT
Start: 2025-01-23 | End: 2025-01-23 | Stop reason: HOSPADM

## 2025-01-23 RX ORDER — WATER 1 ML/ML
IRRIGANT IRRIGATION AS NEEDED
Status: DISCONTINUED | OUTPATIENT
Start: 2025-01-23 | End: 2025-01-23 | Stop reason: HOSPADM

## 2025-01-23 RX ORDER — FENTANYL CITRATE 50 UG/ML
25 INJECTION, SOLUTION INTRAMUSCULAR; INTRAVENOUS EVERY 5 MIN PRN
Status: DISCONTINUED | OUTPATIENT
Start: 2025-01-23 | End: 2025-01-23 | Stop reason: HOSPADM

## 2025-01-23 RX ORDER — ACETAMINOPHEN 325 MG/1
650 TABLET ORAL EVERY 4 HOURS PRN
Status: DISCONTINUED | OUTPATIENT
Start: 2025-01-23 | End: 2025-01-23 | Stop reason: HOSPADM

## 2025-01-23 RX ORDER — DIPHENHYDRAMINE HYDROCHLORIDE 50 MG/ML
12.5 INJECTION INTRAMUSCULAR; INTRAVENOUS ONCE AS NEEDED
Status: DISCONTINUED | OUTPATIENT
Start: 2025-01-23 | End: 2025-01-23 | Stop reason: HOSPADM

## 2025-01-23 RX ORDER — ONDANSETRON HYDROCHLORIDE 2 MG/ML
4 INJECTION, SOLUTION INTRAVENOUS ONCE AS NEEDED
Status: DISCONTINUED | OUTPATIENT
Start: 2025-01-23 | End: 2025-01-23 | Stop reason: HOSPADM

## 2025-01-23 RX ORDER — CIPROFLOXACIN 2 MG/ML
400 INJECTION, SOLUTION INTRAVENOUS ONCE
Status: COMPLETED | OUTPATIENT
Start: 2025-01-23 | End: 2025-01-23

## 2025-01-23 RX ORDER — FAMOTIDINE 10 MG/ML
INJECTION INTRAVENOUS AS NEEDED
Status: DISCONTINUED | OUTPATIENT
Start: 2025-01-23 | End: 2025-01-23

## 2025-01-23 RX ORDER — OXYCODONE HYDROCHLORIDE 5 MG/1
10 TABLET ORAL EVERY 4 HOURS PRN
Status: DISCONTINUED | OUTPATIENT
Start: 2025-01-23 | End: 2025-01-23 | Stop reason: HOSPADM

## 2025-01-23 RX ORDER — NITROFURANTOIN 25; 75 MG/1; MG/1
100 CAPSULE ORAL 2 TIMES DAILY
Qty: 6 CAPSULE | Refills: 0 | Status: SHIPPED | OUTPATIENT
Start: 2025-01-23 | End: 2025-01-26

## 2025-01-23 RX ORDER — PROPOFOL 10 MG/ML
INJECTION, EMULSION INTRAVENOUS AS NEEDED
Status: DISCONTINUED | OUTPATIENT
Start: 2025-01-23 | End: 2025-01-23

## 2025-01-23 RX ORDER — PHENYLEPHRINE HCL IN 0.9% NACL 1 MG/10 ML
SYRINGE (ML) INTRAVENOUS AS NEEDED
Status: DISCONTINUED | OUTPATIENT
Start: 2025-01-23 | End: 2025-01-23

## 2025-01-23 RX ORDER — OXYBUTYNIN CHLORIDE 10 MG/1
10 TABLET, EXTENDED RELEASE ORAL DAILY
Qty: 30 TABLET | Refills: 2 | Status: SHIPPED | OUTPATIENT
Start: 2025-01-23 | End: 2025-04-23

## 2025-01-23 RX ORDER — MEPERIDINE HYDROCHLORIDE 25 MG/ML
12.5 INJECTION INTRAMUSCULAR; INTRAVENOUS; SUBCUTANEOUS EVERY 10 MIN PRN
Status: DISCONTINUED | OUTPATIENT
Start: 2025-01-23 | End: 2025-01-23 | Stop reason: HOSPADM

## 2025-01-23 RX ORDER — ALBUTEROL SULFATE 0.83 MG/ML
2.5 SOLUTION RESPIRATORY (INHALATION) ONCE AS NEEDED
Status: DISCONTINUED | OUTPATIENT
Start: 2025-01-23 | End: 2025-01-23 | Stop reason: HOSPADM

## 2025-01-23 RX ORDER — SODIUM CHLORIDE, SODIUM LACTATE, POTASSIUM CHLORIDE, CALCIUM CHLORIDE 600; 310; 30; 20 MG/100ML; MG/100ML; MG/100ML; MG/100ML
INJECTION, SOLUTION INTRAVENOUS CONTINUOUS PRN
Status: DISCONTINUED | OUTPATIENT
Start: 2025-01-23 | End: 2025-01-23

## 2025-01-23 RX ORDER — LIDOCAINE HYDROCHLORIDE 20 MG/ML
INJECTION, SOLUTION EPIDURAL; INFILTRATION; INTRACAUDAL; PERINEURAL AS NEEDED
Status: DISCONTINUED | OUTPATIENT
Start: 2025-01-23 | End: 2025-01-23

## 2025-01-23 RX ORDER — SODIUM CHLORIDE, SODIUM LACTATE, POTASSIUM CHLORIDE, CALCIUM CHLORIDE 600; 310; 30; 20 MG/100ML; MG/100ML; MG/100ML; MG/100ML
100 INJECTION, SOLUTION INTRAVENOUS CONTINUOUS
Status: DISCONTINUED | OUTPATIENT
Start: 2025-01-23 | End: 2025-01-23 | Stop reason: HOSPADM

## 2025-01-23 RX ORDER — OXYCODONE HYDROCHLORIDE 5 MG/1
5 TABLET ORAL EVERY 4 HOURS PRN
Status: DISCONTINUED | OUTPATIENT
Start: 2025-01-23 | End: 2025-01-23 | Stop reason: HOSPADM

## 2025-01-23 RX ORDER — FENTANYL CITRATE 50 UG/ML
INJECTION, SOLUTION INTRAMUSCULAR; INTRAVENOUS AS NEEDED
Status: DISCONTINUED | OUTPATIENT
Start: 2025-01-23 | End: 2025-01-23

## 2025-01-23 RX ORDER — LIDOCAINE HYDROCHLORIDE 10 MG/ML
0.1 INJECTION, SOLUTION EPIDURAL; INFILTRATION; INTRACAUDAL; PERINEURAL ONCE
Status: DISCONTINUED | OUTPATIENT
Start: 2025-01-23 | End: 2025-01-23 | Stop reason: HOSPADM

## 2025-01-23 RX ORDER — OXYCODONE AND ACETAMINOPHEN 5; 325 MG/1; MG/1
1 TABLET ORAL EVERY 6 HOURS PRN
Qty: 15 TABLET | Refills: 0 | Status: SHIPPED | OUTPATIENT
Start: 2025-01-23

## 2025-01-23 RX ORDER — SODIUM CHLORIDE, SODIUM LACTATE, POTASSIUM CHLORIDE, CALCIUM CHLORIDE 600; 310; 30; 20 MG/100ML; MG/100ML; MG/100ML; MG/100ML
100 INJECTION, SOLUTION INTRAVENOUS ONCE
Status: COMPLETED | OUTPATIENT
Start: 2025-01-23 | End: 2025-01-23

## 2025-01-23 RX ADMIN — DEXAMETHASONE SODIUM PHOSPHATE 4 MG: 4 INJECTION, SOLUTION INTRAMUSCULAR; INTRAVENOUS at 13:00

## 2025-01-23 RX ADMIN — SODIUM CHLORIDE, SODIUM LACTATE, POTASSIUM CHLORIDE, AND CALCIUM CHLORIDE: 600; 310; 30; 20 INJECTION, SOLUTION INTRAVENOUS at 12:43

## 2025-01-23 RX ADMIN — LIDOCAINE HYDROCHLORIDE 100 MG: 20 INJECTION, SOLUTION EPIDURAL; INFILTRATION; INTRACAUDAL; PERINEURAL at 12:51

## 2025-01-23 RX ADMIN — Medication 100 MCG: at 13:25

## 2025-01-23 RX ADMIN — FENTANYL CITRATE 25 MCG: 50 INJECTION INTRAMUSCULAR; INTRAVENOUS at 13:10

## 2025-01-23 RX ADMIN — Medication 200 MCG: at 13:04

## 2025-01-23 RX ADMIN — CIPROFLOXACIN 400 MG: 2 INJECTION, SOLUTION INTRAVENOUS at 12:29

## 2025-01-23 RX ADMIN — SODIUM CHLORIDE, POTASSIUM CHLORIDE, SODIUM LACTATE AND CALCIUM CHLORIDE 100 ML/HR: 600; 310; 30; 20 INJECTION, SOLUTION INTRAVENOUS at 12:29

## 2025-01-23 RX ADMIN — Medication 100 MCG: at 13:09

## 2025-01-23 RX ADMIN — ONDANSETRON 4 MG: 2 INJECTION INTRAMUSCULAR; INTRAVENOUS at 13:58

## 2025-01-23 RX ADMIN — FAMOTIDINE 20 MG: 10 INJECTION, SOLUTION INTRAVENOUS at 13:00

## 2025-01-23 RX ADMIN — Medication 200 MCG: at 13:21

## 2025-01-23 RX ADMIN — PROPOFOL 80 MG: 10 INJECTION, EMULSION INTRAVENOUS at 12:51

## 2025-01-23 RX ADMIN — Medication 100 MCG: at 13:17

## 2025-01-23 RX ADMIN — FENTANYL CITRATE 25 MCG: 50 INJECTION INTRAMUSCULAR; INTRAVENOUS at 12:51

## 2025-01-23 SDOH — HEALTH STABILITY: MENTAL HEALTH: CURRENT SMOKER: 0

## 2025-01-23 ASSESSMENT — PAIN - FUNCTIONAL ASSESSMENT
PAIN_FUNCTIONAL_ASSESSMENT: 0-10

## 2025-01-23 ASSESSMENT — PAIN SCALES - GENERAL
PAIN_LEVEL: 0
PAINLEVEL_OUTOF10: 0 - NO PAIN

## 2025-01-23 NOTE — DISCHARGE INSTRUCTIONS
Extracorporeal Shock Wave Lithotripsy Discharge Instructions    About this topic  The urinary tract is made up of the kidney, ureters, bladder, and urethra. The kidneys make urine and it drains down into tubes called ureters. These ureters are connected to the bladder. The bladder then squeezes out the urine and it exits the body through the urethra.  Sometimes, salts and minerals in your urine build up and form stones. The stones are hard and can get stuck on their way out of the body. Some stones are too large and block the flow of urine. Others cause bleeding and pain. They may damage the kidney. These stones need a procedure to break them up.  Some kidney stones may be broken up without cutting the skin. A special procedure called extracorporeal shock wave lithotripsy is used to break the stone down into tiny sand-like pieces.    What care is needed at home?  Ask your doctor what you need to do when you go home. Make sure you ask questions if you do not understand what the doctor says. This way you will know what you need to do.  Ask your doctor when you should resume taking blood-thinning drugs or aspirin.  Pieces of the kidney stone may pass in the urine for a few days and cause mild pain. Your doctor may give you drugs for the pain. Take the drugs as ordered by your doctor.  Drink 8 to 10 glasses of water each day. This will help flush the broken kidney stones out.  Your doctor may ask you to strain your urine by using a filter. This will hold the stone pieces that will be tested.  What follow-up care is needed?  Your doctor may ask you to make visits to the office to check on your progress. Be sure to keep these visits.  Your doctor may ask you to get an x-ray to see how fully your stone has broken up.  If you were asked to filter your urine, bring the collected stones on your next visit.  If your doctor used a small tube, called a stent, to help pass larger kidney stone pieces, your doctor may set up a  visit to remove this.  What drugs may be needed?  The doctor may order drugs to:  Help with pain  Prevent infection  Relax smooth muscles in your ureter to help flush out kidney stones  Prevent kidney stones  Will physical activity be limited?  You may have to limit your activity for a while. Talk to your doctor about the right amount of activity for you.  What changes to diet are needed?  Talk to your doctor or dietitian about your personal diet plan. Ask if there are foods you should avoid.  Avoid caffeinated drinks that can overwork your urinary tract.  What problems could happen?  Pain while pieces of the kidney stone pass  Blocked urine flow if stone fragments are too big to pass  Kidney injury  Bleeding around the kidney  High blood pressure  Urinary tract infection  Blood in the urine  Bruising  Discomfort in the back or belly  Skin damage  Stones could recur  What can be done to prevent this health problem?  Prevent or treat urinary tract infections.  Drink lots of water during the day and evening. When you have less fluid in your body, urine becomes concentrated. This increases your chance of kidney stones.  Follow the diet plan your dietitian gives you to prevent kidney stones.  Limit foods or drugs that may cause kidney stones.  When do I need to call the doctor?  Signs of infection. These include a fever of 100.4°F (38°C) or higher, chills, pain or burning with passing urine.  Very bad pain in your back or side that will not go away  Throwing up  Urine smells bad, looks cloudy, or has blood in it  No urine for more than 6 hours  Very bad pain in your chest, shoulder, or belly  More swelling of your ankles, legs, and hands or tightness with your shoes or rings  Teach Back: Helping You Understand  The Teach Back Method helps you understand the information we are giving you. After you talk with the staff, tell them in your own words what you learned. This helps to make sure the staff has described each  thing clearly. It also helps to explain things that may have been confusing. Before going home, make sure you can do these:  I can tell you about my procedure.  I can tell you how to strain my urine for pieces of stone.  I can tell you what I will do if I have a fever, pain in my back or side that will not go away, no urine, changes to my urine, or swelling.  Where can I learn more?  [employer]  https://www.baus.org.uk/_userfiles/pages/files/Patients/Leaflets/ESWL.pdf  National Health Service  https://www.nhs.uk/conditions/kidney-stones/  National Kidney and Urologic Diseases Information Clearinghouse  https://www.niddk.nih.gov/health-information/urologic-diseases/kidney-stones/treatment  Last Reviewed Date  2020-10-08  General Anesthesia Discharge Instructions    About this topic  You may need general anesthesia if you need to be asleep during a procedure. Your doctor will use drugs to block the signals that go from your nerves to your brain. Doctors give general anesthesia during a surgery or procedure to:  Allow you to sleep  Help your body be still  Relax your muscles  Help you to relax and be pain free  Keep you from remembering the surgery  Let the doctor manage your airway, breathing, and blood flow  The doctor or nurse anesthetist gives general anesthesia by a shot into your vein. Sometimes, you may breathe in a gas through a mask placed over your face.  What care is needed at home?  Ask your doctor what you need to do when you go home. Make sure you ask questions if you do not understand what the doctor says.  Your doctor may give you drugs to prevent or treat an upset stomach from the anesthetic. Take them as ordered.  If your throat is sore, suck on ice chips or popsicles to ease throat pain.  Put 2 to 3 pillows under your head and back when you lie down to help you breathe easier.  For the first 24 to 48 hours:  Do not operate heavy or dangerous machinery.  Do not make  major decisions or sign important papers. You may not be able to think clearly.  Avoid beer, wine, or mixed drinks.  You are at a higher risk of falling for at least 24 hours after general anesthesia.  Take extra care when you get up.  Do not change positions quickly.  Do not rush when you need to go to the bathroom or to answer the phone.  Ask for help if you feel unsteady when you try to walk.  Wear shoes with non-slip soles and low heels.  What follow-up care is needed?  Your doctor may ask you to come back to the office to check on your progress. Be sure to keep these visits.  If you have stitches that do not dissolve or staples, you will need to have them removed. Your doctor will want to do this in 1 to 2 weeks. If the doctor used skin glue, the glue will fall off on its own.  What drugs may be needed?  The doctor may order drugs to:  Help with pain  Treat an upset stomach or throwing up  Will physical activity be limited?  You will not be allowed to drive right away after the procedure. Ask a family member or a friend to drive you home.  Avoid trying to get out of bed without help until you are sure of your balance.  You may have to limit your activity. Talk to your doctor about if you need to limit how much you lift or limit exercise after your procedure.  What changes to diet are needed?  Start with a light diet when you are fully awake. This includes things that are easy to swallow like soups, pudding, jello, toast, and eggs. Slowly progress to your normal diet.  What problems could happen?  Low blood pressure  Breathing problems  Upset stomach or throwing up  Dizziness  Blood clots  Infection  When do I need to call the doctor?  Trouble breathing  Upset stomach or throwing up more than 3 times in the next 2 days  Dizziness  Teach Back: Helping You Understand  The Teach Back Method helps you understand the information we are giving you. After you talk with the staff, tell them in your own words what you  learned. This helps to make sure the staff has described each thing clearly. It also helps to explain things that may have been confusing. Before going home, make sure you can do these:  I can tell you about my procedure.  I can tell you if I need to follow up with my doctor.  I can tell you what is good for me to eat and drink the next day.  I can tell you what I would do if I have trouble breathing, an upset stomach, or dizziness.  Where can I learn more?  [employer] [place] [employer]  https://www.nigms.nih.gov/education/pages/factsheet_Anesthesia.aspx  NHS Choices  http://www.nhs.uk/conditions/Anaesthetic-general/Pages/Definition.aspx  Last Reviewed Date  2020-04-22  Physician phone number provided to patient upon discharge

## 2025-01-23 NOTE — ANESTHESIA POSTPROCEDURE EVALUATION
Patient: Nicolas Mcleod    Procedure Summary       Date: 01/23/25 Room / Location: [facility] / [facility]    Anesthesia Start: 1243 Anesthesia Stop: 1421    Procedures:       CYSTOSCOPY, WITH URETERAL STENT INSERTION (Right)      LITHOTRIPSY, ESWL (Right) Diagnosis:       Nephrolithiasis      Ureteral calculus      Hydronephrosis with renal and ureteral calculus obstruction      Flank pain      Urinary tract infection with hematuria, site unspecified      Bladder instability      (Nephrolithiasis [N20.0])      (Ureteral calculus [N20.1])      (Hydronephrosis with renal and ureteral calculus obstruction [N13.2])      (Flank pain [R10.9])      (Urinary tract infection with hematuria, site unspecified [N39.0, R31.9])      (Bladder instability [N32.89])    Surgeons: Ramiro Duong MD Responsible Provider: Shekhar Mccann MD    Anesthesia Type: general ASA Status: 3            Anesthesia Type: general    Vitals Value Taken Time   /66 01/23/25 1415   Temp 36 °C (96.8 °F) 01/23/25 1413   Pulse 56 01/23/25 1419   Resp 22 01/23/25 1419   SpO2 94 % 01/23/25 1419   Vitals shown include unfiled device data.    Anesthesia Post Evaluation    Patient location during evaluation: PACU  Patient participation: complete - patient participated  Level of consciousness: awake and alert  Pain score: 0  Pain management: adequate  Airway patency: patent  Cardiovascular status: acceptable and hemodynamically stable  Respiratory status: face mask, spontaneous ventilation, acceptable and nonlabored ventilation  Hydration status: balanced  Postoperative Nausea and Vomiting: none        There were no known notable events for this encounter.

## 2025-01-23 NOTE — ANESTHESIA PREPROCEDURE EVALUATION
Patient: Nicolas Mcleod    Procedure Information       Date/Time: 01/23/25 1330    Procedures:       CYSTOSCOPY, WITH URETERAL STENT INSERTION (Right) - 6 [place] by 20 cm, ESWL,Shockwave lithotripter      LITHOTRIPSY, ESWL (Right)    Location: [facility] / [facility]    Surgeons: Ramiro Duong MD            Relevant Problems   Cardiac   (+) Coronary artery disease without angina pectoris   (+) Essential hypertension   (+) Heart murmur   (+) Mixed hyperlipidemia      /Renal   (+) Hydronephrosis with renal and ureteral calculus obstruction   (+) Nephrolithiasis   (+) Urinary tract infection with hematuria      Endocrine   (+) Class 1 obesity with body mass index (BMI) of 30.0 to 30.9 in adult   (+) Hyperparathyroidism (Multi)      Musculoskeletal   (+) Rheumatoid arthritis      HEENT   (+) Mixed hearing loss of left ear   (+) Sensorineural hearing loss of right ear      ID   (+) Influenza   (+) Urinary tract infection with hematuria       Clinical information reviewed:   Tobacco  Allergies  Meds  Problems  Med Hx  Surg Hx   Fam Hx  Soc   Hx        NPO Detail:  NPO/Void Status  Date of Last Liquid: 01/22/25  Time of Last Liquid: 2355  Date of Last Solid: 01/22/25  Time of Last Solid: 2200  Last Intake Type: Clear fluids  Time of Last Void: 1130         Physical Exam    Airway  Mallampati: II  TM distance: >3 FB  Neck ROM: full     Cardiovascular - normal exam     Dental    Pulmonary - normal exam     Abdominal - normal exam             Anesthesia Plan    History of general anesthesia?: yes  History of complications of general anesthesia?: no    ASA 3     general     The patient is not a current smoker.  Patient did not smoke on day of procedure.    intravenous induction   Anesthetic plan and risks discussed with patient.    Plan discussed with CRNA and attending.

## 2025-01-23 NOTE — ANESTHESIA PROCEDURE NOTES
Airway  Date/Time: 1/23/2025 12:55 PM  Urgency: elective    Airway not difficult    Staffing  Performed: MARBIN   Authorized by: Shekhar Mccann MD    Performed by: Arianna Medina  Patient location during procedure: OR    Indications and Patient Condition  Indications for airway management: anesthesia  Spontaneous Ventilation: absent  Sedation level: deep  Preoxygenated: yes  Patient position: sniffing  Mask difficulty assessment: 0 - not attempted  Planned trial extubation    Final Airway Details  Final airway type: supraglottic airway      Successful airway: Supraglottic airway: AMBU.  Size 4     Number of attempts at approach: 1  Ventilation between attempts: none  Number of other approaches attempted: 0

## 2025-01-23 NOTE — OP NOTE
CYSTOSCOPY, WITH URETERAL STENT INSERTION (R), LITHOTRIPSY, ESWL (R) Operative Note     Date: 2025  OR Location: [facility]    Name: Nicolas Mcleod, : 1940, Age: 84 y.o., MRN: [id number], Sex: female    Diagnosis  Pre-op Diagnosis      * Nephrolithiasis [N20.0]     * Ureteral calculus [N20.1]     * Hydronephrosis with renal and ureteral calculus obstruction [N13.2]     * Flank pain [R10.9]     * Urinary tract infection with hematuria, site unspecified [N39.0, R31.9]     * Bladder instability [N32.89] Post-op Diagnosis     * Nephrolithiasis [N20.0]     * Ureteral calculus [N20.1]     * Hydronephrosis with renal and ureteral calculus obstruction [N13.2]     * Flank pain [R10.9]     * Urinary tract infection with hematuria, site unspecified [N39.0, R31.9]     * Bladder instability [N32.89]     Procedures  CYSTOSCOPY, WITH URETERAL STENT INSERTION  60540 - IN CYSTO W/INSERT URETERAL STENT    LITHOTRIPSY, ESWL  22811 - IN LITHOTRIPSY XTRCORP SHOCK WAVE      Surgeons      * Ramiro Duong - Primary    Resident/Fellow/Other Assistant:  Surgeons and Role:  * No surgeons found with a matching role *    Staff:   Yosefulator: Yulisa Mendez Person: Elizabeth    Anesthesia Staff: Anesthesiologist: Shekhar Mccann MD  CRNA: OMAR Stack  SRNA: Arianna Medina    Procedure Summary  Anesthesia: General  ASA: III  Estimated Blood Loss: 200 mL  Intra-op Medications: Administrations occurring from 1330 to 1535 on 25:  * No intraprocedure medications in log *        Specimen: No specimens collected              Drains and/or Catheters: * None in log *    Tourniquet Times:         Implants: 6 Canadian by 22 cm double-J ureteral stent    Findings: 6 mm right lower pole renal calculus    Indications: Nicolas Mcleod is an 84 y.o. female who is having surgery for Nephrolithiasis [N20.0]  Ureteral calculus [N20.1]  Hydronephrosis with renal and ureteral calculus obstruction [N13.2]  Flank pain [R10.9]  Urinary tract  infection with hematuria, site unspecified [N39.0, R31.9]  Bladder instability [N32.89].  I personally reviewed the patient's CAT scan images on my computer monitor in the examination room with the patient and her daughter.  This was done during our office visit on January 13, 2025.  She noticed 5 mm right lower pole renal calculus.  She is quite concerned regarding migration of the stone and obstruction of the ureter.  She is agreed to proceed with placement of a 6 Cuban by 20 cm double-J ureteral stent and ESWL of her right renal calculus.  Preoperative cardiac risk stratification was provided by KRISTEN Murray.    The patient was seen in the preoperative area. The risks, benefits, complications, treatment options, non-operative alternatives, expected recovery and outcomes were discussed with the patient. The possibilities of reaction to medication, pulmonary aspiration, injury to surrounding structures, bleeding, recurrent infection, the need for additional procedures, failure to diagnose a condition, and creating a complication requiring transfusion or operation were discussed with the patient. The patient concurred with the proposed plan, giving informed consent.  The site of surgery was properly noted/marked if necessary per policy. The patient has been actively warmed in preoperative area. Preoperative antibiotics have been ordered and given within 1 hours of incision. Venous thrombosis prophylaxis are not indicated.    Procedure Details: The #22 Cuban rigid cystoscope was inserted through the urethra to the level of the bladder without incident.  Once inside the bladder, both ureteral orifices were identified.  Full examination of the bladder did not reveal any evidence of stones, lesions or tumors.    A 6 Cuban open-ended ureteral catheter was inserted into the right ureteral orifice and a retropyelogram was performed.  This identified a 6 mm right lower pole renal calculus.    A 0.035 inch guidewire  was cannulated through the ureteral catheter into proper position.  Ureteral catheter was removed with the guidewire remaining in place.  A 6 Austrian by 22 cm double-J ureteral stent was cannulated over the guidewire into proper position.  The cystoscope and guidewire were removed with the ureteral stent remaining in place.  A 20 Austrian Marie catheter was inserted for intraoperative urinary drainage.    Patient was placed in the supine position.  With the use of fluoroscopy, the right lower pole stone was isolated.  At this time, extracorporeal shockwave lithotripsy was performed.  A 2 total of 2500 shockwaves were delivered.  Localization was performed every 250 shocks.  The Marie catheter was removed.    The patient tolerated the procedure well and was transferred to the postanesthesia care unit alert, awake, and in good condition.  We will await postoperative imaging studies.     This note was created with voice-recognition software and was not corrected for typographical or grammatical errors.    Complications:  None; patient tolerated the procedure well.    Disposition: PACU - hemodynamically stable.  Condition: stable                 Additional Details:     Attending Attestation: I performed the procedure.    Ramiro Duong  Phone Number: [phone]

## 2025-01-29 ENCOUNTER — HOSPITAL ENCOUNTER (OUTPATIENT)
Dept: RADIOLOGY | Facility: HOSPITAL | Age: 85
Discharge: HOME | End: 2025-01-29
Payer: MEDICARE

## 2025-01-29 DIAGNOSIS — Z98.890 OTHER SPECIFIED POSTPROCEDURAL STATES: ICD-10-CM

## 2025-01-29 PROCEDURE — 74019 RADEX ABDOMEN 2 VIEWS: CPT | Performed by: RADIOLOGY

## 2025-01-29 PROCEDURE — 74019 RADEX ABDOMEN 2 VIEWS: CPT

## 2025-02-04 DIAGNOSIS — Z00.00 HEALTHCARE MAINTENANCE: ICD-10-CM

## 2025-02-04 RX ORDER — NITROFURANTOIN MACROCRYSTALS 50 MG/1
50 CAPSULE ORAL EVERY 12 HOURS
Qty: 4 CAPSULE | Refills: 0 | Status: SHIPPED | OUTPATIENT
Start: 2025-02-04 | End: 2025-02-06

## 2025-02-05 ENCOUNTER — OFFICE VISIT (OUTPATIENT)
Dept: ORTHOPEDIC SURGERY | Facility: CLINIC | Age: 85
End: 2025-02-05
Payer: MEDICARE

## 2025-02-05 ENCOUNTER — HOSPITAL ENCOUNTER (OUTPATIENT)
Dept: RADIOLOGY | Facility: CLINIC | Age: 85
Discharge: HOME | End: 2025-02-05
Payer: MEDICARE

## 2025-02-05 VITALS — HEIGHT: 62 IN | WEIGHT: 148 LBS | BODY MASS INDEX: 27.23 KG/M2

## 2025-02-05 DIAGNOSIS — M25.512 LEFT SHOULDER PAIN, UNSPECIFIED CHRONICITY: ICD-10-CM

## 2025-02-05 DIAGNOSIS — M19.019 GLENOHUMERAL ARTHRITIS: Primary | ICD-10-CM

## 2025-02-05 PROCEDURE — 73030 X-RAY EXAM OF SHOULDER: CPT | Mod: LT

## 2025-02-05 PROCEDURE — 99213 OFFICE O/P EST LOW 20 MIN: CPT | Performed by: ORTHOPAEDIC SURGERY

## 2025-02-05 NOTE — PROGRESS NOTES
History of present illness: History left shoulder cuff arthropathy cortisone shot to 3 weeks ago doing very well she said she is 80% improved    Physical exam:    General: No acute distress or breathing difficulty or discomfort, pleasant and cooperative with the examination.    Extremities: The left shoulder was inspected and was found to have no obvious deformity.  There was tenderness to touch over the lateral edges of the shoulder over the rotator cuff insertion.  Active forward flexion 110 degrees, external rotation to 50 degrees, abduction to 45 degrees, and internal rotation to the level of L2.    At this time the shoulder is neurovascular tact and neurosensory intact.  Motor intact C5-T1.  There was no obvious neck pain or radiculopathy noted.  There was no gross instability or adhesive capsulitis symptoms.  There was no evidence of apprehension or apprehension suppression.    Strength was tested in 4 planes with weakness in the supraspinatus strength testing and external rotation position.  There was no strength deficit in internal rotation.  Impingement signs were positive both supine and standing for impingement test type I and II.  There was mild pain over the bicipital groove with a positive speeds sign    Diagnostic studies: X-rays today show advanced end-stage rotator cuff arthropathy left shoulder    Impression: Fortunately clinically she is doing well with the injection in terms of pain relief and motion    Plan: Follow-up as needed when the shot wears off injections could be performed 3 times a year without complication failing to improve with injections we would discuss shoulder arthroplasty or replacement which would require significant medical comorbidity and management

## 2025-02-14 ENCOUNTER — APPOINTMENT (OUTPATIENT)
Dept: UROLOGY | Facility: CLINIC | Age: 85
End: 2025-02-14
Payer: MEDICARE

## 2025-02-14 VITALS
BODY MASS INDEX: 27.78 KG/M2 | DIASTOLIC BLOOD PRESSURE: 76 MMHG | HEART RATE: 72 BPM | WEIGHT: 151.9 LBS | SYSTOLIC BLOOD PRESSURE: 141 MMHG | RESPIRATION RATE: 14 BRPM

## 2025-02-14 DIAGNOSIS — N39.0 URINARY TRACT INFECTION WITH HEMATURIA, SITE UNSPECIFIED: ICD-10-CM

## 2025-02-14 DIAGNOSIS — N28.1 RENAL CYST: ICD-10-CM

## 2025-02-14 DIAGNOSIS — R31.9 URINARY TRACT INFECTION WITH HEMATURIA, SITE UNSPECIFIED: ICD-10-CM

## 2025-02-14 DIAGNOSIS — N20.0 NEPHROLITHIASIS: ICD-10-CM

## 2025-02-14 DIAGNOSIS — N20.0 NEPHROLITHIASIS: Primary | ICD-10-CM

## 2025-02-14 PROCEDURE — 52310 CYSTOSCOPY AND TREATMENT: CPT | Performed by: UROLOGY

## 2025-02-14 NOTE — PATIENT INSTRUCTIONS
It was very nice to see you and your daughter Becky once again today.  You had successful surgery with shockwave lithotripsy of your stone in the right kidney.  Today you underwent removal of your right ureteral stent.  We will see you again in 1 year.     This note was created with voice-recognition software and was not corrected for typographical or grammatical errors.

## 2025-02-14 NOTE — LETTER
February 14, 2025     Pilo Heath MD  [address]  [place] [address]    Patient: Nicolas Mcleod   YOB: 1940   Date of Visit: 2/14/2025       Dear Dr. Pilo Heath MD:    Thank you for referring Nicolas Mcleod to me for evaluation. Below are my notes for this consultation.  If you have questions, please do not hesitate to call me. I look forward to following your patient along with you.       Sincerely,     Ramiro Duong MD      CC: No Recipients  ______________________________________________________________________________________           return in February 2026        With a renal colic CAT scan

## 2025-02-14 NOTE — PROGRESS NOTES
"Patient ID: Nicolas Mcleod is a 84 y.o. female.    Procedures  Pt took macrodantin 50mg po as prescribed  Anesthesia: Local 2% Lidocaine  Instruments: 6F flexible disposable cystoscope, disposable grasping forcpes    Pt brought to procedure room and placed in dorsal lithotomy position. Pt draped and prepped in normal sterile fashion. 5ml lidocaine instilled into urethral meatus and 5ml instilled into vagina. Pt tolerated well.    I was present as chaperone for the entirety of the procedure   Kimberly Momin  Cystoscopy performed by Dr. Ramiro Duong    CYSTOSCOPY AND STENT REMOVAL  Bedside \"Time Out\" Verification   Today's Date:  2/14/2025. I attest that this time out verification took place prior to the procedure.   Procedure: cysto   RN/LPN/MA:AMY   Provider: [facility].   Verified By: RN/LPN/MA, AMY and Provider.   Prior to the start of the procedure a time out was taken and the following were verified: the identity of the patient using two patient identifiers, the correct procedure, the correct site marked as indicated, the correct positioning for the patient and the correct equipment was obtained.   Cystoscopy - female NICOLAS ALMNOTESidentified using two (2) forms of identification.   Procedure: diagnostic cystourethroscopy.  Procedure Note: Time Started:  Time Completed: STARTED: 10:30AM  COMPLETED:  Indications for procedure: irritable voiding symptoms.   Discussed with patient: Risks, benefits, and alternative were discussed in detail. Patient appears to understand and agrees to proceed. Patient has signed the procedure consent form.    "

## 2025-03-01 ENCOUNTER — PHARMACY VISIT (OUTPATIENT)
Dept: PHARMACY | Facility: CLINIC | Age: 85
End: 2025-03-01
Payer: COMMERCIAL

## 2025-03-01 PROCEDURE — RXMED WILLOW AMBULATORY MEDICATION CHARGE

## 2025-05-19 ENCOUNTER — PHARMACY VISIT (OUTPATIENT)
Dept: PHARMACY | Facility: CLINIC | Age: 85
End: 2025-05-19
Payer: COMMERCIAL

## 2025-05-19 PROCEDURE — RXMED WILLOW AMBULATORY MEDICATION CHARGE

## 2025-08-17 LAB — BACTERIA UR CULT: ABNORMAL

## 2025-08-18 DIAGNOSIS — N39.0 URINARY TRACT INFECTION WITHOUT HEMATURIA, SITE UNSPECIFIED: ICD-10-CM

## 2025-08-18 RX ORDER — NITROFURANTOIN 25; 75 MG/1; MG/1
100 CAPSULE ORAL 2 TIMES DAILY
Qty: 6 CAPSULE | Refills: 0 | Status: SHIPPED | OUTPATIENT
Start: 2025-08-18 | End: 2025-08-21

## 2025-08-20 ENCOUNTER — APPOINTMENT (OUTPATIENT)
Dept: CARDIOLOGY | Facility: CLINIC | Age: 85
End: 2025-08-20
Payer: MEDICARE

## 2025-09-17 ENCOUNTER — APPOINTMENT (OUTPATIENT)
Dept: CARDIOLOGY | Facility: CLINIC | Age: 85
End: 2025-09-17
Payer: MEDICARE

## 2026-02-16 ENCOUNTER — APPOINTMENT (OUTPATIENT)
Dept: UROLOGY | Facility: CLINIC | Age: 86
End: 2026-02-16
Payer: MEDICARE

## (undated) DEVICE — GOWN, SURGICAL, ROYAL SILK, LG, STERILE

## (undated) DEVICE — CATHETER, URETHRAL, FOLEY, 2 WAY, BARDEX LUBRICATH, SHORT LENGTH, 20 FR, 5 CC, LATEX

## (undated) DEVICE — TUBING, SUCTION, 6MM X 10, CLEAN N-COND

## (undated) DEVICE — GUIDEWIRE, SOLO FLEX HYBRID, .035 STRAIGHT TIP

## (undated) DEVICE — DRAINBAG, 15.5 X 31, 2600/2800 UROVIEW, STERILE

## (undated) DEVICE — SOLUTION, IRRIGATION, USP, STERILE WATER, UROLOGICAL, 3000 ML, BAG

## (undated) DEVICE — BAG, DRAINAGE, ANTI-REFLUX CHAMBER, 2000ML

## (undated) DEVICE — GLOVE, SURGICAL, PROTEXIS PI , 7.0, PF, LF

## (undated) DEVICE — SYRINGE, 10 CC, LUER LOCK

## (undated) DEVICE — TRAY, SKIN SCRUB, WET PREP, WITH 4 COMPARMENT

## (undated) DEVICE — DRAPE, TIBURON, LITHOTOMY, W/FLUID CONTROL POUCH

## (undated) DEVICE — HOLSTER, ELECTROSURGERY ACCESSORY, STERILE

## (undated) DEVICE — TRAY, MINOR, SINGLE BASIN, STERILE

## (undated) DEVICE — CATHETER, URETERAL, FLEXTIP, 6FR X 70CM, LF

## (undated) DEVICE — Device